# Patient Record
Sex: FEMALE | Race: WHITE | NOT HISPANIC OR LATINO
[De-identification: names, ages, dates, MRNs, and addresses within clinical notes are randomized per-mention and may not be internally consistent; named-entity substitution may affect disease eponyms.]

---

## 2019-01-24 ENCOUNTER — APPOINTMENT (OUTPATIENT)
Dept: OBGYN | Facility: CLINIC | Age: 19
End: 2019-01-24
Payer: COMMERCIAL

## 2019-01-24 VITALS
WEIGHT: 150 LBS | SYSTOLIC BLOOD PRESSURE: 100 MMHG | BODY MASS INDEX: 25.61 KG/M2 | HEIGHT: 64 IN | DIASTOLIC BLOOD PRESSURE: 75 MMHG

## 2019-01-24 DIAGNOSIS — Z83.3 FAMILY HISTORY OF DIABETES MELLITUS: ICD-10-CM

## 2019-01-24 DIAGNOSIS — Z78.9 OTHER SPECIFIED HEALTH STATUS: ICD-10-CM

## 2019-01-24 DIAGNOSIS — Z82.49 FAMILY HISTORY OF ISCHEMIC HEART DISEASE AND OTHER DISEASES OF THE CIRCULATORY SYSTEM: ICD-10-CM

## 2019-01-24 DIAGNOSIS — F19.90 OTHER PSYCHOACTIVE SUBSTANCE USE, UNSPECIFIED, UNCOMPLICATED: ICD-10-CM

## 2019-01-24 DIAGNOSIS — F17.200 NICOTINE DEPENDENCE, UNSPECIFIED, UNCOMPLICATED: ICD-10-CM

## 2019-01-24 PROCEDURE — 99385 PREV VISIT NEW AGE 18-39: CPT

## 2019-01-24 NOTE — PHYSICAL EXAM
[Awake] : awake [Alert] : alert [Acute Distress] : no acute distress [Mass] : no breast mass [Nipple Discharge] : no nipple discharge [Axillary LAD] : no axillary lymphadenopathy [Soft] : soft [Tender] : non tender [Oriented x3] : oriented to person, place, and time [Normal] : uterus [No Bleeding] : there was no active vaginal bleeding [Uterine Adnexae] : were not tender and not enlarged [RRR, No Murmurs] : RRR, no murmurs [CTAB] : CTAB

## 2019-01-28 LAB
C TRACH RRNA SPEC QL NAA+PROBE: NOT DETECTED
N GONORRHOEA RRNA SPEC QL NAA+PROBE: NOT DETECTED
SOURCE AMPLIFICATION: NORMAL

## 2019-03-05 ENCOUNTER — ASOB RESULT (OUTPATIENT)
Age: 19
End: 2019-03-05

## 2019-03-05 ENCOUNTER — APPOINTMENT (OUTPATIENT)
Dept: OBGYN | Facility: CLINIC | Age: 19
End: 2019-03-05
Payer: COMMERCIAL

## 2019-03-05 VITALS
BODY MASS INDEX: 26.29 KG/M2 | HEIGHT: 64 IN | WEIGHT: 154 LBS | SYSTOLIC BLOOD PRESSURE: 102 MMHG | DIASTOLIC BLOOD PRESSURE: 68 MMHG

## 2019-03-05 PROCEDURE — 76857 US EXAM PELVIC LIMITED: CPT

## 2019-03-05 PROCEDURE — 81025 URINE PREGNANCY TEST: CPT

## 2019-03-05 PROCEDURE — 99213 OFFICE O/P EST LOW 20 MIN: CPT

## 2019-03-05 PROCEDURE — 76830 TRANSVAGINAL US NON-OB: CPT

## 2019-03-06 LAB
HCG UR QL: NEGATIVE
QUALITY CONTROL: YES

## 2019-05-16 ENCOUNTER — APPOINTMENT (OUTPATIENT)
Dept: OBGYN | Facility: CLINIC | Age: 19
End: 2019-05-16
Payer: COMMERCIAL

## 2019-05-16 VITALS
SYSTOLIC BLOOD PRESSURE: 109 MMHG | HEART RATE: 70 BPM | WEIGHT: 150 LBS | DIASTOLIC BLOOD PRESSURE: 62 MMHG | HEIGHT: 64 IN | BODY MASS INDEX: 25.61 KG/M2

## 2019-05-16 PROCEDURE — 99213 OFFICE O/P EST LOW 20 MIN: CPT

## 2019-05-16 PROCEDURE — 81003 URINALYSIS AUTO W/O SCOPE: CPT | Mod: NC,QW

## 2019-05-16 NOTE — PHYSICAL EXAM
[Soft] : soft [None] : no CVA tenderness [Tender] : non tender [H/Smegaly] : no hepatosplenomegaly [Distended] : not distended

## 2019-05-20 LAB — BACTERIA UR CULT: NORMAL

## 2020-10-11 ENCOUNTER — TRANSCRIPTION ENCOUNTER (OUTPATIENT)
Age: 20
End: 2020-10-11

## 2020-11-17 ENCOUNTER — APPOINTMENT (OUTPATIENT)
Dept: OBGYN | Facility: CLINIC | Age: 20
End: 2020-11-17
Payer: COMMERCIAL

## 2020-11-17 VITALS
HEIGHT: 64 IN | WEIGHT: 150 LBS | BODY MASS INDEX: 25.61 KG/M2 | SYSTOLIC BLOOD PRESSURE: 110 MMHG | DIASTOLIC BLOOD PRESSURE: 70 MMHG

## 2020-11-17 PROCEDURE — 99214 OFFICE O/P EST MOD 30 MIN: CPT

## 2020-11-17 RX ORDER — PHENAZOPYRIDINE HYDROCHLORIDE 200 MG/1
200 TABLET ORAL
Qty: 15 | Refills: 0 | Status: ACTIVE | COMMUNITY
Start: 2020-11-17 | End: 1900-01-01

## 2020-11-22 LAB
BACTERIA GENITAL AEROBE CULT: ABNORMAL
BACTERIA UR CULT: ABNORMAL

## 2021-04-19 ENCOUNTER — TRANSCRIPTION ENCOUNTER (OUTPATIENT)
Age: 21
End: 2021-04-19

## 2022-11-24 ENCOUNTER — EMERGENCY (EMERGENCY)
Facility: HOSPITAL | Age: 22
LOS: 1 days | Discharge: HOME | End: 2022-11-24
Attending: EMERGENCY MEDICINE
Payer: COMMERCIAL

## 2022-11-24 VITALS
RESPIRATION RATE: 16 BRPM | DIASTOLIC BLOOD PRESSURE: 79 MMHG | TEMPERATURE: 97 F | SYSTOLIC BLOOD PRESSURE: 120 MMHG | HEART RATE: 77 BPM | OXYGEN SATURATION: 100 %

## 2022-11-24 LAB
APPEARANCE UR: CLEAR — SIGNIFICANT CHANGE UP
BACTERIA # UR AUTO: ABNORMAL
BASOPHILS # BLD AUTO: 0.06 K/UL — SIGNIFICANT CHANGE UP (ref 0–0.2)
BASOPHILS NFR BLD AUTO: 0.8 % — SIGNIFICANT CHANGE UP (ref 0–2)
BILIRUB UR-MCNC: NEGATIVE — SIGNIFICANT CHANGE UP
COLOR SPEC: YELLOW — SIGNIFICANT CHANGE UP
DIFF PNL FLD: ABNORMAL
EOSINOPHIL # BLD AUTO: 0.1 K/UL — SIGNIFICANT CHANGE UP (ref 0–0.5)
EOSINOPHIL NFR BLD AUTO: 1.3 % — SIGNIFICANT CHANGE UP (ref 0–6)
EPI CELLS # UR: SIGNIFICANT CHANGE UP
GLUCOSE UR QL: NEGATIVE MG/DL — SIGNIFICANT CHANGE UP
HCG SERPL-ACNC: <4 MIU/ML — SIGNIFICANT CHANGE UP
HCG UR QL: NEGATIVE — SIGNIFICANT CHANGE UP
HCT VFR BLD CALC: 36.9 % — SIGNIFICANT CHANGE UP (ref 34.5–45)
HGB BLD-MCNC: 12.6 G/DL — SIGNIFICANT CHANGE UP (ref 11.5–15.5)
IMM GRANULOCYTES NFR BLD AUTO: 0.4 % — SIGNIFICANT CHANGE UP (ref 0–0.9)
KETONES UR-MCNC: NEGATIVE — SIGNIFICANT CHANGE UP
LEUKOCYTE ESTERASE UR-ACNC: NEGATIVE — SIGNIFICANT CHANGE UP
LYMPHOCYTES # BLD AUTO: 1.7 K/UL — SIGNIFICANT CHANGE UP (ref 1–3.3)
LYMPHOCYTES # BLD AUTO: 22.7 % — SIGNIFICANT CHANGE UP (ref 13–44)
MCHC RBC-ENTMCNC: 31.8 PG — SIGNIFICANT CHANGE UP (ref 27–34)
MCHC RBC-ENTMCNC: 34.1 GM/DL — SIGNIFICANT CHANGE UP (ref 32–36)
MCV RBC AUTO: 93.2 FL — SIGNIFICANT CHANGE UP (ref 80–100)
MONOCYTES # BLD AUTO: 0.61 K/UL — SIGNIFICANT CHANGE UP (ref 0–0.9)
MONOCYTES NFR BLD AUTO: 8.1 % — SIGNIFICANT CHANGE UP (ref 2–14)
NEUTROPHILS # BLD AUTO: 4.99 K/UL — SIGNIFICANT CHANGE UP (ref 1.8–7.4)
NEUTROPHILS NFR BLD AUTO: 66.7 % — SIGNIFICANT CHANGE UP (ref 43–77)
NITRITE UR-MCNC: NEGATIVE — SIGNIFICANT CHANGE UP
PH UR: 8 — SIGNIFICANT CHANGE UP (ref 5–8)
PLATELET # BLD AUTO: 364 K/UL — SIGNIFICANT CHANGE UP (ref 150–400)
PROT UR-MCNC: NEGATIVE — SIGNIFICANT CHANGE UP
RBC # BLD: 3.96 M/UL — SIGNIFICANT CHANGE UP (ref 3.8–5.2)
RBC # FLD: 11.9 % — SIGNIFICANT CHANGE UP (ref 10.3–14.5)
RBC CASTS # UR COMP ASSIST: >50 /HPF (ref 0–4)
SP GR SPEC: 1.01 — SIGNIFICANT CHANGE UP (ref 1.01–1.02)
UROBILINOGEN FLD QL: NEGATIVE MG/DL — SIGNIFICANT CHANGE UP
WBC # BLD: 7.49 K/UL — SIGNIFICANT CHANGE UP (ref 3.8–10.5)
WBC # FLD AUTO: 7.49 K/UL — SIGNIFICANT CHANGE UP (ref 3.8–10.5)
WBC UR QL: SIGNIFICANT CHANGE UP /HPF (ref 0–5)

## 2022-11-24 PROCEDURE — 85025 COMPLETE CBC W/AUTO DIFF WBC: CPT

## 2022-11-24 PROCEDURE — 87077 CULTURE AEROBIC IDENTIFY: CPT

## 2022-11-24 PROCEDURE — 81025 URINE PREGNANCY TEST: CPT

## 2022-11-24 PROCEDURE — 99284 EMERGENCY DEPT VISIT MOD MDM: CPT

## 2022-11-24 PROCEDURE — 99283 EMERGENCY DEPT VISIT LOW MDM: CPT

## 2022-11-24 PROCEDURE — 84702 CHORIONIC GONADOTROPIN TEST: CPT

## 2022-11-24 PROCEDURE — 81001 URINALYSIS AUTO W/SCOPE: CPT

## 2022-11-24 PROCEDURE — 36415 COLL VENOUS BLD VENIPUNCTURE: CPT

## 2022-11-24 PROCEDURE — 87086 URINE CULTURE/COLONY COUNT: CPT

## 2022-11-24 NOTE — ED STATDOCS - PATIENT PORTAL LINK FT
You can access the FollowMyHealth Patient Portal offered by [facility] by registering at the following website: [email or web address]. By joining [email or web address]’s FollowMyHealth portal, you will also be able to view your health information using other applications (apps) compatible with our system.

## 2022-11-24 NOTE — ED STATDOCS - ATTENDING APP SHARED VISIT CONTRIBUTION OF CARE
I, Rosa Castaneda, performed the initial face to face bedside interview with this patient regarding history of present illness, review of symptoms and relevant past medical, social and family history.  I completed an independent physical examination.  I was the initial provider who evaluated this patient. I have signed out the follow up of any pending tests (i.e. labs, radiological studies) to the ACP.  I have communicated the patient’s plan of care and disposition with the ACP.

## 2022-11-24 NOTE — ED STATDOCS - PROGRESS NOTE DETAILS
POLO- h&H stable, neg pregnancy, pt to f/u with obgyn, Pt reassessed, pt feeling better at this time, vss, pt able to walk, talk and vocalized plan of action. Discussed in depth and explained to pt in depth the next steps that need to be taking including proper follow up with PCP or specialists. All incidental findings were discussed with pt as well. Pt verbalized their concerns and all questions were answered. Pt understands dispo and wants discharge. Given good instructions when to return to ED and importance of f/u.

## 2022-11-24 NOTE — ED ADULT TRIAGE NOTE - CHIEF COMPLAINT QUOTE
Pt. complaining of vaginally bleeding since Saturday. Pt. states she doesn't know if its her period or not. Pt. states she could be pregnant. Pt. states her last period was sometime in October.

## 2022-11-24 NOTE — ED STATDOCS - NS ED ATTENDING STATEMENT MOD
This was a shared visit with the ULISES. I reviewed and verified the documentation and independently performed the documented:

## 2022-11-24 NOTE — ED STATDOCS - OBJECTIVE STATEMENT
21 y/o female with no significant PMHx presents to the ED c/o worsening vaginal bleeding for 5 days. Current menstrual cycle began 5 days ago, which was light brown or pinkish in color. Previous cycle was in mid October. Pt has a Hx of irregular menstrual cycles and is concerned for possible pregnancy. Pt took a pregnancy test one day ago which came back negative. As per pt, period typically lasts 2-4 days, but this current period is longer and getting gradually heavier. Reports associated nausea yesterday but no vomiting. No Hx of fibroids, but has one episode of seizures in 2021. Denies taking daily medication. Denies f/c/v/cp/sob/palpitations/cough/rash/headache/dizziness/abd.pain/d/c/dysuria/hematuria

## 2022-11-24 NOTE — ED STATDOCS - CLINICAL SUMMARY MEDICAL DECISION MAKING FREE TEXT BOX
Possibly dysfunctional uterine bleeding as notes current period is different from prior periods. here to check for pregnancy. Will check HCG, UA and re-assess. If pregnant will need an US.

## 2022-11-28 LAB
CULTURE RESULTS: SIGNIFICANT CHANGE UP
SPECIMEN SOURCE: SIGNIFICANT CHANGE UP

## 2023-01-28 ENCOUNTER — EMERGENCY (EMERGENCY)
Facility: HOSPITAL | Age: 23
LOS: 1 days | Discharge: HOME | End: 2023-01-28
Attending: EMERGENCY MEDICINE
Payer: COMMERCIAL

## 2023-01-28 VITALS
WEIGHT: 125 LBS | HEART RATE: 83 BPM | RESPIRATION RATE: 20 BRPM | HEIGHT: 64 IN | SYSTOLIC BLOOD PRESSURE: 119 MMHG | TEMPERATURE: 98 F | DIASTOLIC BLOOD PRESSURE: 80 MMHG | OXYGEN SATURATION: 98 %

## 2023-01-28 PROCEDURE — 73564 X-RAY EXAM KNEE 4 OR MORE: CPT

## 2023-01-28 PROCEDURE — 96374 THER/PROPH/DIAG INJ IV PUSH: CPT

## 2023-01-28 PROCEDURE — 99284 EMERGENCY DEPT VISIT MOD MDM: CPT

## 2023-01-28 PROCEDURE — 99284 EMERGENCY DEPT VISIT MOD MDM: CPT | Mod: 25

## 2023-01-28 PROCEDURE — 73564 X-RAY EXAM KNEE 4 OR MORE: CPT | Mod: 26,RT

## 2023-01-28 RX ORDER — IBUPROFEN 200 MG
1 TABLET ORAL
Qty: 20 | Refills: 0
Start: 2023-01-28 | End: 2023-02-01

## 2023-01-28 RX ORDER — CEFAZOLIN SODIUM 1 G
2000 VIAL (EA) INJECTION ONCE
Refills: 0 | Status: COMPLETED | OUTPATIENT
Start: 2023-01-28 | End: 2023-01-28

## 2023-01-28 RX ORDER — CEFAZOLIN SODIUM 1 G
2000 VIAL (EA) INJECTION ONCE
Refills: 0 | Status: DISCONTINUED | OUTPATIENT
Start: 2023-01-28 | End: 2023-01-28

## 2023-01-28 RX ORDER — CEPHALEXIN 500 MG
1 CAPSULE ORAL
Qty: 40 | Refills: 0
Start: 2023-01-28 | End: 2023-02-06

## 2023-01-28 RX ADMIN — Medication 2000 MILLIGRAM(S): at 17:53

## 2023-01-28 NOTE — ED PROVIDER NOTE - CARE PROVIDER_API CALL
Wesley Chisholm)  Orthopaedic Surgery  [address] [place] [address]  Phone: [phone]  Fax: [phone]  Follow Up Time:

## 2023-01-28 NOTE — ED PROVIDER NOTE - PATIENT PORTAL LINK FT
You can access the FollowMyHealth Patient Portal offered by [facility] by registering at the following website: [email or web address]. By joining [employer]’s FollowMyHealth portal, you will also be able to view your health information using other applications (apps) compatible with our system.

## 2023-01-28 NOTE — ED PROVIDER NOTE - CLINICAL SUMMARY MEDICAL DECISION MAKING FREE TEXT BOX
22F presenting with right knee cellulitis x 3 days. +FROM of the right knee with no actual knee pain reported. Xrays reviewed - negative for acute pathology. Pts given Ancef x 1 dose IV in the ED. Keflex sent to the pharmacy. Pt given ortho f/u as needed. Instructed to f/u with ortho or return to the ED if she develops swelling, worsening redness or fluid collection.

## 2023-01-28 NOTE — ED PROVIDER NOTE - SKIN, MLM
+ localized region of erythema over the right anterior knee approx 3cm x 3cm in size. No palpable fluid collection. No open wounds or active bleeding. No streaking erythema.

## 2023-03-16 ENCOUNTER — APPOINTMENT (OUTPATIENT)
Dept: OBGYN | Facility: CLINIC | Age: 23
End: 2023-03-16
Payer: COMMERCIAL

## 2023-03-16 VITALS
DIASTOLIC BLOOD PRESSURE: 67 MMHG | HEIGHT: 64 IN | SYSTOLIC BLOOD PRESSURE: 120 MMHG | WEIGHT: 128 LBS | BODY MASS INDEX: 21.85 KG/M2

## 2023-03-16 DIAGNOSIS — Z00.00 ENCOUNTER FOR GENERAL ADULT MEDICAL EXAMINATION W/OUT ABNORMAL FINDINGS: ICD-10-CM

## 2023-03-16 LAB
HCG UR QL: POSITIVE
QUALITY CONTROL: YES

## 2023-03-16 PROCEDURE — 76830 TRANSVAGINAL US NON-OB: CPT

## 2023-03-16 PROCEDURE — 99395 PREV VISIT EST AGE 18-39: CPT

## 2023-03-16 PROCEDURE — 99213 OFFICE O/P EST LOW 20 MIN: CPT | Mod: 25

## 2023-03-16 PROCEDURE — 81025 URINE PREGNANCY TEST: CPT

## 2023-03-16 RX ORDER — ASCORBIC ACID, CHOLECALCIFEROL, .ALPHA.-TOCOPHEROL ACETATE, DL-, PYRIDOXINE, FOLIC ACID, CALCIUM, FERROUS FUMARATE, DOCONEXENT 28; 160; 27; 400; 30; 25; 1.25; 3 MG/1; MG/1; MG/1; [IU]/1; [IU]/1; MG/1; MG/1; MG/1
27-1.25-3 CAPSULE, GELATIN COATED ORAL
Qty: 90 | Refills: 3 | Status: ACTIVE | COMMUNITY
Start: 2023-03-16 | End: 1900-01-01

## 2023-03-18 LAB
C TRACH RRNA SPEC QL NAA+PROBE: NOT DETECTED
N GONORRHOEA RRNA SPEC QL NAA+PROBE: NOT DETECTED
SOURCE TP AMPLIFICATION: NORMAL

## 2023-03-19 PROBLEM — Z00.00 ENCOUNTER FOR PREVENTIVE HEALTH EXAMINATION: Status: ACTIVE | Noted: 2019-01-02

## 2023-03-19 NOTE — HISTORY OF PRESENT ILLNESS
[FreeTextEntry1] : 23 yo pt here for annual exam and eval of sc amen. \par was trying to conceive, not taking pnv. \par meds- none\par nkda\par co nausea/vomiting in am past few days\par

## 2023-03-19 NOTE — PLAN
[FreeTextEntry1] : annual exam. sec amen , tvus shows viable iup. \par pnv prescribed. \par n/v in pregnancy, dw pt trial of diclegis, prescribed. \par dw pt ativity and dietary restrictions, toxin avoidance.

## 2023-03-19 NOTE — PROCEDURE
[Transvaginal OB Sonogram] : Transvaginal OB Sonogram [Intrauterine Pregnancy] : intrauterine pregnancy [Yolk Sac] : yolk sac present [Fetal Heart] : fetal heart present [CRL: ___ (mm)] : CRL - [unfilled]Umm [Date: ___] : Date: [unfilled] [Current GA by Sonogram: ___ (wks)] : Current GA by Sonogram: [unfilled]Uwks [___ day(s)] : [unfilled] days [Transvaginal OB Sonogram WNL] : Transvaginal OB Sonogram WNL [FreeTextEntry1] : early iup

## 2023-03-23 LAB — CYTOLOGY CVX/VAG DOC THIN PREP: NORMAL

## 2023-04-03 ENCOUNTER — NON-APPOINTMENT (OUTPATIENT)
Age: 23
End: 2023-04-03

## 2023-04-04 ENCOUNTER — NON-APPOINTMENT (OUTPATIENT)
Age: 23
End: 2023-04-04

## 2023-04-04 ENCOUNTER — APPOINTMENT (OUTPATIENT)
Dept: OBGYN | Facility: CLINIC | Age: 23
End: 2023-04-04
Payer: COMMERCIAL

## 2023-04-04 VITALS
SYSTOLIC BLOOD PRESSURE: 133 MMHG | BODY MASS INDEX: 22.02 KG/M2 | HEIGHT: 64 IN | WEIGHT: 129 LBS | DIASTOLIC BLOOD PRESSURE: 77 MMHG

## 2023-04-04 PROCEDURE — 0501F PRENATAL FLOW SHEET: CPT

## 2023-04-08 ENCOUNTER — LABORATORY RESULT (OUTPATIENT)
Age: 23
End: 2023-04-08

## 2023-04-09 ENCOUNTER — EMERGENCY (EMERGENCY)
Facility: HOSPITAL | Age: 23
LOS: 1 days | Discharge: HOME | End: 2023-04-09
Attending: EMERGENCY MEDICINE
Payer: COMMERCIAL

## 2023-04-09 VITALS
HEIGHT: 65 IN | OXYGEN SATURATION: 98 % | TEMPERATURE: 98 F | DIASTOLIC BLOOD PRESSURE: 83 MMHG | RESPIRATION RATE: 18 BRPM | SYSTOLIC BLOOD PRESSURE: 126 MMHG | WEIGHT: 128.09 LBS | HEART RATE: 82 BPM

## 2023-04-09 VITALS
HEART RATE: 80 BPM | SYSTOLIC BLOOD PRESSURE: 122 MMHG | RESPIRATION RATE: 18 BRPM | TEMPERATURE: 98 F | OXYGEN SATURATION: 98 % | DIASTOLIC BLOOD PRESSURE: 78 MMHG

## 2023-04-09 LAB
ALBUMIN SERPL ELPH-MCNC: 4.3 G/DL — SIGNIFICANT CHANGE UP (ref 3.3–5.2)
ALP SERPL-CCNC: 46 U/L — SIGNIFICANT CHANGE UP (ref 40–120)
ALT FLD-CCNC: 9 U/L — SIGNIFICANT CHANGE UP
ANION GAP SERPL CALC-SCNC: 12 MMOL/L — SIGNIFICANT CHANGE UP (ref 5–17)
AST SERPL-CCNC: 11 U/L — SIGNIFICANT CHANGE UP
BASOPHILS # BLD AUTO: 0.05 K/UL — SIGNIFICANT CHANGE UP (ref 0–0.2)
BASOPHILS NFR BLD AUTO: 0.7 % — SIGNIFICANT CHANGE UP (ref 0–2)
BILIRUB SERPL-MCNC: 0.8 MG/DL — SIGNIFICANT CHANGE UP (ref 0.4–2)
BUN SERPL-MCNC: 9.1 MG/DL — SIGNIFICANT CHANGE UP (ref 8–20)
CALCIUM SERPL-MCNC: 9.1 MG/DL — SIGNIFICANT CHANGE UP (ref 8.4–10.5)
CHLORIDE SERPL-SCNC: 104 MMOL/L — SIGNIFICANT CHANGE UP (ref 96–108)
CO2 SERPL-SCNC: 24 MMOL/L — SIGNIFICANT CHANGE UP (ref 22–29)
CREAT SERPL-MCNC: 0.43 MG/DL — LOW (ref 0.5–1.3)
CRP SERPL-MCNC: <4 MG/L — SIGNIFICANT CHANGE UP
EGFR: 139 ML/MIN/1.73M2 — SIGNIFICANT CHANGE UP
EOSINOPHIL # BLD AUTO: 0.2 K/UL — SIGNIFICANT CHANGE UP (ref 0–0.5)
EOSINOPHIL NFR BLD AUTO: 2.6 % — SIGNIFICANT CHANGE UP (ref 0–6)
ERYTHROCYTE [SEDIMENTATION RATE] IN BLOOD: 20 MM/HR — SIGNIFICANT CHANGE UP (ref 0–20)
GLUCOSE SERPL-MCNC: 86 MG/DL — SIGNIFICANT CHANGE UP (ref 70–99)
HCG SERPL-ACNC: HIGH MIU/ML
HCT VFR BLD CALC: 33.8 % — LOW (ref 34.5–45)
HGB BLD-MCNC: 11.5 G/DL — SIGNIFICANT CHANGE UP (ref 11.5–15.5)
IMM GRANULOCYTES NFR BLD AUTO: 0.4 % — SIGNIFICANT CHANGE UP (ref 0–0.9)
LYMPHOCYTES # BLD AUTO: 1.86 K/UL — SIGNIFICANT CHANGE UP (ref 1–3.3)
LYMPHOCYTES # BLD AUTO: 24.2 % — SIGNIFICANT CHANGE UP (ref 13–44)
MCHC RBC-ENTMCNC: 30.1 PG — SIGNIFICANT CHANGE UP (ref 27–34)
MCHC RBC-ENTMCNC: 34 GM/DL — SIGNIFICANT CHANGE UP (ref 32–36)
MCV RBC AUTO: 88.5 FL — SIGNIFICANT CHANGE UP (ref 80–100)
MONOCYTES # BLD AUTO: 0.57 K/UL — SIGNIFICANT CHANGE UP (ref 0–0.9)
MONOCYTES NFR BLD AUTO: 7.4 % — SIGNIFICANT CHANGE UP (ref 2–14)
NEUTROPHILS # BLD AUTO: 4.97 K/UL — SIGNIFICANT CHANGE UP (ref 1.8–7.4)
NEUTROPHILS NFR BLD AUTO: 64.7 % — SIGNIFICANT CHANGE UP (ref 43–77)
PLATELET # BLD AUTO: 322 K/UL — SIGNIFICANT CHANGE UP (ref 150–400)
POTASSIUM SERPL-MCNC: 3.8 MMOL/L — SIGNIFICANT CHANGE UP (ref 3.5–5.3)
POTASSIUM SERPL-SCNC: 3.8 MMOL/L — SIGNIFICANT CHANGE UP (ref 3.5–5.3)
PROT SERPL-MCNC: 7 G/DL — SIGNIFICANT CHANGE UP (ref 6.6–8.7)
RAPID RVP RESULT: SIGNIFICANT CHANGE UP
RBC # BLD: 3.82 M/UL — SIGNIFICANT CHANGE UP (ref 3.8–5.2)
RBC # FLD: 12.1 % — SIGNIFICANT CHANGE UP (ref 10.3–14.5)
SARS-COV-2 RNA SPEC QL NAA+PROBE: SIGNIFICANT CHANGE UP
SODIUM SERPL-SCNC: 140 MMOL/L — SIGNIFICANT CHANGE UP (ref 135–145)
WBC # BLD: 7.68 K/UL — SIGNIFICANT CHANGE UP (ref 3.8–10.5)
WBC # FLD AUTO: 7.68 K/UL — SIGNIFICANT CHANGE UP (ref 3.8–10.5)

## 2023-04-09 PROCEDURE — 85652 RBC SED RATE AUTOMATED: CPT

## 2023-04-09 PROCEDURE — 80053 COMPREHEN METABOLIC PANEL: CPT

## 2023-04-09 PROCEDURE — 86618 LYME DISEASE ANTIBODY: CPT

## 2023-04-09 PROCEDURE — 86666 EHRLICHIA ANTIBODY: CPT

## 2023-04-09 PROCEDURE — 86140 C-REACTIVE PROTEIN: CPT

## 2023-04-09 PROCEDURE — 99283 EMERGENCY DEPT VISIT LOW MDM: CPT

## 2023-04-09 PROCEDURE — 0225U NFCT DS DNA&RNA 21 SARSCOV2: CPT

## 2023-04-09 PROCEDURE — 85025 COMPLETE CBC W/AUTO DIFF WBC: CPT

## 2023-04-09 PROCEDURE — 86753 PROTOZOA ANTIBODY NOS: CPT

## 2023-04-09 PROCEDURE — 84702 CHORIONIC GONADOTROPIN TEST: CPT

## 2023-04-09 PROCEDURE — 99284 EMERGENCY DEPT VISIT MOD MDM: CPT

## 2023-04-09 PROCEDURE — 36415 COLL VENOUS BLD VENIPUNCTURE: CPT

## 2023-04-09 NOTE — ED ADULT NURSE NOTE - CAS ELECT INFOMATION PROVIDED
DC instructions given by MD and verbalized understanding. Pt remains alert and O x4. NAD noted. Resp E/U./DC instructions

## 2023-04-09 NOTE — ED PROVIDER NOTE - PHYSICAL EXAMINATION
Const: Awake, alert and oriented. In no acute distress. Well appearing.  HEENT: NC/AT. Moist mucous membranes.  Eyes: No scleral icterus. EOMI.  Neck:. Soft and supple. Full ROM without pain.  Cardiac: +S1/S2. No murmurs. Peripheral pulses 2+ and symmetric. No LE edema.  Resp: Speaking in full sentences. No evidence of respiratory distress. No wheezes, rales or rhonchi.  Abd: Soft, non-tender, non-distended. Normal bowel sounds in all 4 quadrants. No guarding or rebound.  Back: Spine midline and non-tender. No CVAT.  Skin: No rashes, abrasions or lacerations. no signs of infection noted, no redness  MSK: FROM in all extremities neurovasculary intact   Lymph: No cervical lymphadenopathy.  Neuro: Awake, alert & oriented x 3. Moves all extremities symmetrically.

## 2023-04-09 NOTE — ED PROVIDER NOTE - NSFOLLOWUPCLINICS_GEN_ALL_ED_FT
[facility]  Rheumatology  [address] [address]  Phone: [phone]  Fax:

## 2023-04-09 NOTE — ED PROVIDER NOTE - ATTENDING APP SHARED VISIT CONTRIBUTION OF CARE
23yoF; with PMH with no signif PMH; now p/w arthralgias. patient states she was here for similar symptoms in January and was diagnosed with cellulitis of knees at that time--was discharged on keflex but did not complete the full course of abx.  patient states this evening, she began having bilateral knee, ankle, and elbow pain.  denies f/c/s. denies n/v. denies cp/sob/palp. denies abd pain. denies dysuria, frequency, urgency. denies vaginal bleeding.  denies trauma. denies sick contacts.  states she is 10 weeks pregnancy, but no abd and vaginal concerns.  General:     NAD  Head:     NC/AT, EOMI, oral mucosa moist  Neck:     trachea midline  Lungs:     CTA b/l, no w/r/r  CVS:     S1S2, RRR, no m/g/r  Abd:     +BS, s/nt/nd, no organomegaly  Ext:    2+ radial and pedal pulses, no c/c/e. from @ bilateral hip, knee, ankle.  trace bilateral knee effusion. no erythema over knee. no warmth over joints. from @ shoulders, elbows, wrist.   Neuro: AAOx3, no sensory/motor deficits  A/P:  23yoF p/w arthralgia  -labs, rvp, ua, re-eval

## 2023-04-09 NOTE — ED PROVIDER NOTE - PATIENT PORTAL LINK FT
You can access the FollowMyHealth Patient Portal offered by [facility] by registering at the following website: [email or web address]. By joining [email or web address]’s FollowMyHealth portal, you will also be able to view your health information using other applications (apps) compatible with our system.

## 2023-04-09 NOTE — ED PROVIDER NOTE - OBJECTIVE STATEMENT
pt is a 24 y/o female presenting to the ed for evaluation of knee pain. pt states back in january she came to the ed knee pain/redness and was dx with cellulitis. pt states she never finished the course of antibiotics. pt states now is experiencing pain to both knees since this morning and feels they are red. pt also stating has pain to bilateral ankles and elbows. pt denies hx of lyme disease no recent ticks  pt denies cp sob fever abd pain back pain numbness or loss of sensation abrasions or lacerations

## 2023-04-09 NOTE — ED PROVIDER NOTE - CLINICAL SUMMARY MEDICAL DECISION MAKING FREE TEXT BOX
pt is a 22 y/o female presenting to the ed for evaluation of knee pain. pt states back in january she came to the ed knee pain/redness and was dx with cellulitis. pt states she never finished the course of antibiotics. pt states now is experiencing pain to both knees since this morning and feels they are red. pt also stating has pain to bilateral ankles and elbows. pt denies hx of lyme disease no recent ticks, will check lab work - cbc/cmp/esr/crp/lupus/lyme

## 2023-04-09 NOTE — ED ADULT TRIAGE NOTE - CHIEF COMPLAINT QUOTE
pt presents to ED complaining of R knee pain since dx with cellulitis of R knee in January and pain hasn't completely gone away. pt also complaining of generalized joint pain, took no meds PTA.

## 2023-04-10 LAB
B BURGDOR C6 AB SER-ACNC: NEGATIVE — SIGNIFICANT CHANGE UP
B BURGDOR IGG+IGM SER-ACNC: 0.14 INDEX — SIGNIFICANT CHANGE UP (ref 0.01–0.89)
DRVVT RATIO: 1.02 RATIO — SIGNIFICANT CHANGE UP (ref 0–1.21)
DRVVT SCREEN TO CONFIRM RATIO: SIGNIFICANT CHANGE UP
NORMALIZED SCT PPP-RTO: 1.11 RATIO — SIGNIFICANT CHANGE UP (ref 0–1.16)
NORMALIZED SCT PPP-RTO: SIGNIFICANT CHANGE UP

## 2023-04-12 LAB
A PHAGOCYTOPH IGG TITR SER IF: SIGNIFICANT CHANGE UP TITER
B BURGDOR AB SER QL IA: NEGATIVE — SIGNIFICANT CHANGE UP
B MICROTI IGG TITR SER: SIGNIFICANT CHANGE UP TITER
E CHAFFEENSIS IGG TITR SER IF: SIGNIFICANT CHANGE UP TITER

## 2023-04-17 ENCOUNTER — TRANSCRIPTION ENCOUNTER (OUTPATIENT)
Age: 23
End: 2023-04-17

## 2023-04-17 LAB
CHROMOSOME13 INTERPRETATION: NORMAL
CHROMOSOME13 TEST RESULT: NORMAL
CHROMOSOME18 INTERPRETATION: NORMAL
CHROMOSOME18 TEST RESULT: NORMAL
CHROMOSOME21 INTERPRETATION: NORMAL
CHROMOSOME21 TEST RESULT: NORMAL
FETAL FRACTION: NORMAL
PERFORMANCE AND LIMITATIONS: NORMAL
SEX CHROMOSOME INTERPRETATION: NORMAL
SEX CHROMOSOME TEST RESULT: NORMAL
VERIFI PRENATAL TEST: NOT DETECTED

## 2023-05-02 ENCOUNTER — NON-APPOINTMENT (OUTPATIENT)
Age: 23
End: 2023-05-02

## 2023-05-02 ENCOUNTER — APPOINTMENT (OUTPATIENT)
Dept: OBGYN | Facility: CLINIC | Age: 23
End: 2023-05-02
Payer: COMMERCIAL

## 2023-05-02 VITALS
HEIGHT: 64 IN | SYSTOLIC BLOOD PRESSURE: 120 MMHG | DIASTOLIC BLOOD PRESSURE: 80 MMHG | WEIGHT: 136 LBS | BODY MASS INDEX: 23.22 KG/M2

## 2023-05-02 PROCEDURE — 0502F SUBSEQUENT PRENATAL CARE: CPT

## 2023-05-03 ENCOUNTER — NON-APPOINTMENT (OUTPATIENT)
Age: 23
End: 2023-05-03

## 2023-05-18 ENCOUNTER — APPOINTMENT (OUTPATIENT)
Dept: OBGYN | Facility: CLINIC | Age: 23
End: 2023-05-18
Payer: COMMERCIAL

## 2023-05-18 VITALS
DIASTOLIC BLOOD PRESSURE: 80 MMHG | SYSTOLIC BLOOD PRESSURE: 123 MMHG | HEIGHT: 65 IN | WEIGHT: 143 LBS | BODY MASS INDEX: 23.82 KG/M2

## 2023-05-18 PROCEDURE — 0502F SUBSEQUENT PRENATAL CARE: CPT

## 2023-05-18 RX ORDER — DOXYLAMINE SUCCINATE AND PYRIDOXINE HYDROCHLORIDE 10; 10 MG/1; MG/1
10-10 TABLET, DELAYED RELEASE ORAL
Qty: 60 | Refills: 1 | Status: ACTIVE | COMMUNITY
Start: 2023-05-18 | End: 1900-01-01

## 2023-06-19 ENCOUNTER — ASOB RESULT (OUTPATIENT)
Age: 23
End: 2023-06-19

## 2023-06-19 ENCOUNTER — APPOINTMENT (OUTPATIENT)
Dept: OBGYN | Facility: CLINIC | Age: 23
End: 2023-06-19
Payer: COMMERCIAL

## 2023-06-19 ENCOUNTER — APPOINTMENT (OUTPATIENT)
Dept: ANTEPARTUM | Facility: CLINIC | Age: 23
End: 2023-06-19
Payer: COMMERCIAL

## 2023-06-19 VITALS
SYSTOLIC BLOOD PRESSURE: 123 MMHG | DIASTOLIC BLOOD PRESSURE: 70 MMHG | BODY MASS INDEX: 24.32 KG/M2 | WEIGHT: 146 LBS | HEIGHT: 65 IN

## 2023-06-19 DIAGNOSIS — Z33.1 PREGNANT STATE, INCIDENTAL: ICD-10-CM

## 2023-06-19 DIAGNOSIS — R10.2 PELVIC AND PERINEAL PAIN: ICD-10-CM

## 2023-06-19 DIAGNOSIS — N91.1 SECONDARY AMENORRHEA: ICD-10-CM

## 2023-06-19 DIAGNOSIS — Z87.898 PERSONAL HISTORY OF OTHER SPECIFIED CONDITIONS: ICD-10-CM

## 2023-06-19 DIAGNOSIS — R39.9 UNSPECIFIED SYMPTOMS AND SIGNS INVOLVING THE GENITOURINARY SYSTEM: ICD-10-CM

## 2023-06-19 PROCEDURE — 76817 TRANSVAGINAL US OBSTETRIC: CPT

## 2023-06-19 PROCEDURE — 76811 OB US DETAILED SNGL FETUS: CPT

## 2023-06-19 PROCEDURE — 0502F SUBSEQUENT PRENATAL CARE: CPT

## 2023-06-21 ENCOUNTER — APPOINTMENT (OUTPATIENT)
Dept: MATERNAL FETAL MEDICINE | Facility: CLINIC | Age: 23
End: 2023-06-21

## 2023-06-21 ENCOUNTER — APPOINTMENT (OUTPATIENT)
Dept: ANTEPARTUM | Facility: CLINIC | Age: 23
End: 2023-06-21

## 2023-06-21 DIAGNOSIS — Z87.898 PERSONAL HISTORY OF OTHER SPECIFIED CONDITIONS: ICD-10-CM

## 2023-06-21 RX ORDER — NITROFURANTOIN (MONOHYDRATE/MACROCRYSTALS) 25; 75 MG/1; MG/1
100 CAPSULE ORAL TWICE DAILY
Qty: 14 | Refills: 0 | Status: DISCONTINUED | COMMUNITY
Start: 2020-11-17 | End: 2023-06-21

## 2023-06-21 RX ORDER — DOXYLAMINE SUCCINATE AND PYRIDOXINE HYDROCHLORIDE 10; 10 MG/1; MG/1
10-10 TABLET, DELAYED RELEASE ORAL
Qty: 28 | Refills: 4 | Status: DISCONTINUED | COMMUNITY
Start: 2023-03-19 | End: 2023-06-21

## 2023-06-21 RX ORDER — NITROFURANTOIN (MONOHYDRATE/MACROCRYSTALS) 25; 75 MG/1; MG/1
100 CAPSULE ORAL
Qty: 14 | Refills: 0 | Status: DISCONTINUED | COMMUNITY
Start: 2019-05-16 | End: 2023-06-21

## 2023-06-21 RX ORDER — NORGESTIMATE AND ETHINYL ESTRADIOL 0.25-0.035
0.25-35 KIT ORAL DAILY
Qty: 90 | Refills: 1 | Status: DISCONTINUED | COMMUNITY
Start: 2019-01-24 | End: 2023-06-21

## 2023-06-27 LAB
AFP MOM: 0.96
AFP VALUE: 70.9 NG/ML
ALPHA FETOPROTEIN SERUM COMMENT: NORMAL
ALPHA FETOPROTEIN SERUM INTERPRETATION: NORMAL
ALPHA FETOPROTEIN SERUM RESULTS: NORMAL
ALPHA FETOPROTEIN SERUM TEST RESULTS: NORMAL
GESTATIONAL AGE BASED ON: NORMAL
GESTATIONAL AGE ON COLLECTION DATE: 21 WEEKS
INSULIN DEP DIABETES: NO
MATERNAL AGE AT EDD AFP: 24.5 YR
MULTIPLE GESTATION: NO
OSBR RISK 1 IN: NORMAL
RACE: NORMAL
WEIGHT AFP: 128 LBS

## 2023-07-17 ENCOUNTER — NON-APPOINTMENT (OUTPATIENT)
Age: 23
End: 2023-07-17

## 2023-07-17 ENCOUNTER — APPOINTMENT (OUTPATIENT)
Dept: OBGYN | Facility: CLINIC | Age: 23
End: 2023-07-17
Payer: COMMERCIAL

## 2023-07-17 VITALS
BODY MASS INDEX: 24.99 KG/M2 | SYSTOLIC BLOOD PRESSURE: 110 MMHG | WEIGHT: 150 LBS | HEIGHT: 65 IN | DIASTOLIC BLOOD PRESSURE: 70 MMHG

## 2023-07-17 PROCEDURE — 0502F SUBSEQUENT PRENATAL CARE: CPT

## 2023-07-22 ENCOUNTER — OUTPATIENT (OUTPATIENT)
Dept: INPATIENT UNIT | Facility: HOSPITAL | Age: 23
LOS: 1 days | End: 2023-07-22
Payer: COMMERCIAL

## 2023-07-22 VITALS — SYSTOLIC BLOOD PRESSURE: 125 MMHG | HEART RATE: 74 BPM | DIASTOLIC BLOOD PRESSURE: 65 MMHG

## 2023-07-22 VITALS — DIASTOLIC BLOOD PRESSURE: 59 MMHG | SYSTOLIC BLOOD PRESSURE: 119 MMHG | HEART RATE: 77 BPM

## 2023-07-22 DIAGNOSIS — O26.899 OTHER SPECIFIED PREGNANCY RELATED CONDITIONS, UNSPECIFIED TRIMESTER: ICD-10-CM

## 2023-07-22 PROCEDURE — 59025 FETAL NON-STRESS TEST: CPT

## 2023-07-22 PROCEDURE — G0463: CPT

## 2023-07-22 NOTE — OB PROVIDER TRIAGE NOTE - HISTORY OF PRESENT ILLNESS
ASTON AVILA is a 23y GP at 24 weeks 6 days GA who presents to L&D for evaluation s/p fall from standing height. Patient was walking and slipped on wood floor. Her knee broke her fall, but her abdomen hit the ground.    Pt denies vaginal bleeding, contractions and leakage of fluid. She endorses good fetal movement.   Pt denies headaches, visual disturbances, RUQ pain, epigastric pain and new-onset edema.   She denies any urinary complaints.   She denies fevers, chills, nausea, vomiting.   She denies shortness of breath, chest pain, and palpitations.  Pregnancy course is  uncomplicated.     POB: Possible miscarriage, unknown  PGYN: -fibroids/-cysts, denied STD hx, denies abnormal PAPs  PMH: Denies  PSH: Denies  SH: Denies tobacco use, EtOH use and illicit drug use during the pregnancy; Feels safe at home  Meds: PNV  All: None       ASTON AVILA is a 23y GP at 24 weeks 6 days GA who presents to L&D for evaluation s/p fall from standing height at 1400. Patient was walking and slipped on wood floor. Her knee broke her fall, but her abdomen hit the ground.    Pt denies vaginal bleeding, contractions and leakage of fluid. She endorses good fetal movement.   Pt denies headaches, visual disturbances, RUQ pain, epigastric pain and new-onset edema.   She denies any urinary complaints.   She denies fevers, chills, nausea, vomiting.   She denies shortness of breath, chest pain, and palpitations.  Pregnancy course is  uncomplicated.     POB: Possible miscarriage, unknown  PGYN: -fibroids/-cysts, denied STD hx, denies abnormal PAPs  PMH: Denies  PSH: Denies  SH: Denies tobacco use, EtOH use and illicit drug use during the pregnancy; Feels safe at home  Meds: PNV  All: None

## 2023-07-22 NOTE — OB PROVIDER TRIAGE NOTE - NSHPPHYSICALEXAM_GEN_ALL_CORE
T(C): 36.7 (07-22-23 @ 16:51), Max: 36.7 (07-22-23 @ 16:51)  HR: 86 (07-22-23 @ 17:05) (74 - 86)  BP: 125/65 (07-22-23 @ 16:51) (125/65 - 125/65)  RR: 15 (07-22-23 @ 16:51) (15 - 15)  SpO2: 99% (07-22-23 @ 17:05) (99% - 100%)  Gen: NAD, well-appearing  Abd: soft, gravid, no evidence of hematoma  Ext: non-edematous, non-tender     FHT: Baseline 160. moderate variability, -accels, - decels  Stout: not lin

## 2023-07-22 NOTE — OB PROVIDER TRIAGE NOTE - NSOBPROVIDERNOTE_OBGYN_ALL_OB_FT
A/P: ASTON AVILA is a 23y GP at 24 weeks 6 days GA who presents to L&D for evaluation s/p fall from standing height.     Patient denies any abdominal pain, bruising, vaginal bleeding  Bedside sono:  Fetus: 160 baseline, moderate variability, - accels, - decels  [place]: no contractions  Dispo: Home with precautions    Discussed with Dr. Rubalcava A/P: ASTON AVILA is a 23y GP at 24 weeks 6 days GA who presents to L&D for evaluation s/p fall from standing height.     Patient denies any abdominal pain, bruising, vaginal bleeding    Fetus: 160 baseline, moderate variability, - accels, - decels  [place]: no contractions  Dispo: Home with precautions    Discussed with Dr. Rubalcava

## 2023-07-22 NOTE — OB PROVIDER TRIAGE NOTE - ATTENDING COMMENTS
Addendum:    Subjective Hx, Physical Exam, Laboratory & Imaging results reviewed.  I agree with the Resident Physician's assessment and plan of care, as discussed above.  FHR reassuring, gestational age too early for NST. Call, follow up, and return to Hospital parameters reviewed at length.  She was given the opportunity to ask questions and all were addressed. Discharge home.    Adithya Rubalcava MD (OB Hospitalist On-Call)

## 2023-07-22 NOTE — OB RN TRIAGE NOTE - FALL HARM RISK - RISK INTERVENTIONS

## 2023-07-22 NOTE — OB RN TRIAGE NOTE - NSDCRESPRATE_OBGYN_A_OB_NU
Post-Care Instructions: I reviewed with the patient in detail post-care instructions. Patient is to keep the biopsy site dry overnight, and then apply bacitracin twice daily until healed. Patient may apply hydrogen peroxide soaks to remove any crusting. 17

## 2023-07-24 DIAGNOSIS — Z3A.24 24 WEEKS GESTATION OF PREGNANCY: ICD-10-CM

## 2023-07-24 DIAGNOSIS — Z04.3 ENCOUNTER FOR EXAMINATION AND OBSERVATION FOLLOWING OTHER ACCIDENT: ICD-10-CM

## 2023-08-14 ENCOUNTER — ASOB RESULT (OUTPATIENT)
Age: 23
End: 2023-08-14

## 2023-08-14 ENCOUNTER — APPOINTMENT (OUTPATIENT)
Dept: ANTEPARTUM | Facility: CLINIC | Age: 23
End: 2023-08-14
Payer: COMMERCIAL

## 2023-08-14 PROCEDURE — 76816 OB US FOLLOW-UP PER FETUS: CPT

## 2023-08-14 PROCEDURE — 76820 UMBILICAL ARTERY ECHO: CPT | Mod: 59

## 2023-08-14 PROCEDURE — 76819 FETAL BIOPHYS PROFIL W/O NST: CPT | Mod: 59

## 2023-08-16 ENCOUNTER — NON-APPOINTMENT (OUTPATIENT)
Age: 23
End: 2023-08-16

## 2023-08-17 ENCOUNTER — APPOINTMENT (OUTPATIENT)
Dept: OBGYN | Facility: CLINIC | Age: 23
End: 2023-08-17
Payer: COMMERCIAL

## 2023-08-17 VITALS
WEIGHT: 164 LBS | DIASTOLIC BLOOD PRESSURE: 74 MMHG | SYSTOLIC BLOOD PRESSURE: 120 MMHG | HEIGHT: 65 IN | BODY MASS INDEX: 27.32 KG/M2

## 2023-08-17 PROCEDURE — 0502F SUBSEQUENT PRENATAL CARE: CPT

## 2023-08-22 ENCOUNTER — APPOINTMENT (OUTPATIENT)
Dept: ANTEPARTUM | Facility: CLINIC | Age: 23
End: 2023-08-22

## 2023-08-29 ENCOUNTER — APPOINTMENT (OUTPATIENT)
Dept: ANTEPARTUM | Facility: CLINIC | Age: 23
End: 2023-08-29
Payer: COMMERCIAL

## 2023-08-29 ENCOUNTER — ASOB RESULT (OUTPATIENT)
Age: 23
End: 2023-08-29

## 2023-08-29 PROCEDURE — 76819 FETAL BIOPHYS PROFIL W/O NST: CPT

## 2023-08-29 PROCEDURE — 76820 UMBILICAL ARTERY ECHO: CPT | Mod: 59

## 2023-08-29 PROCEDURE — 76816 OB US FOLLOW-UP PER FETUS: CPT

## 2023-09-01 ENCOUNTER — NON-APPOINTMENT (OUTPATIENT)
Age: 23
End: 2023-09-01

## 2023-09-05 ENCOUNTER — APPOINTMENT (OUTPATIENT)
Dept: ANTEPARTUM | Facility: CLINIC | Age: 23
End: 2023-09-05

## 2023-09-05 DIAGNOSIS — O21.9 VOMITING OF PREGNANCY, UNSPECIFIED: ICD-10-CM

## 2023-09-06 ENCOUNTER — NON-APPOINTMENT (OUTPATIENT)
Age: 23
End: 2023-09-06

## 2023-09-06 ENCOUNTER — APPOINTMENT (OUTPATIENT)
Dept: OBGYN | Facility: CLINIC | Age: 23
End: 2023-09-06
Payer: COMMERCIAL

## 2023-09-06 VITALS
SYSTOLIC BLOOD PRESSURE: 123 MMHG | WEIGHT: 162 LBS | BODY MASS INDEX: 26.99 KG/M2 | HEIGHT: 65 IN | DIASTOLIC BLOOD PRESSURE: 74 MMHG

## 2023-09-06 PROCEDURE — 0501F PRENATAL FLOW SHEET: CPT

## 2023-09-14 ENCOUNTER — APPOINTMENT (OUTPATIENT)
Dept: ANTEPARTUM | Facility: CLINIC | Age: 23
End: 2023-09-14
Payer: COMMERCIAL

## 2023-09-14 ENCOUNTER — ASOB RESULT (OUTPATIENT)
Age: 23
End: 2023-09-14

## 2023-09-14 PROCEDURE — 76819 FETAL BIOPHYS PROFIL W/O NST: CPT

## 2023-09-14 PROCEDURE — 76820 UMBILICAL ARTERY ECHO: CPT

## 2023-09-14 PROCEDURE — 93976 VASCULAR STUDY: CPT

## 2023-09-18 ENCOUNTER — APPOINTMENT (OUTPATIENT)
Dept: ANTEPARTUM | Facility: CLINIC | Age: 23
End: 2023-09-18

## 2023-09-18 ENCOUNTER — NON-APPOINTMENT (OUTPATIENT)
Age: 23
End: 2023-09-18

## 2023-09-20 ENCOUNTER — APPOINTMENT (OUTPATIENT)
Dept: OBGYN | Facility: CLINIC | Age: 23
End: 2023-09-20
Payer: COMMERCIAL

## 2023-09-20 VITALS
SYSTOLIC BLOOD PRESSURE: 124 MMHG | HEIGHT: 65 IN | BODY MASS INDEX: 27.32 KG/M2 | DIASTOLIC BLOOD PRESSURE: 84 MMHG | WEIGHT: 164 LBS

## 2023-09-20 DIAGNOSIS — F41.9 OTHER MENTAL DISORDERS COMPLICATING PREGNANCY, UNSPECIFIED TRIMESTER: ICD-10-CM

## 2023-09-20 DIAGNOSIS — O99.340 OTHER MENTAL DISORDERS COMPLICATING PREGNANCY, UNSPECIFIED TRIMESTER: ICD-10-CM

## 2023-09-20 DIAGNOSIS — Z34.93 ENCOUNTER FOR SUPERVISION OF NORMAL PREGNANCY, UNSPECIFIED, THIRD TRIMESTER: ICD-10-CM

## 2023-09-20 DIAGNOSIS — O36.5990 MATERNAL CARE FOR OTHER KNOWN OR SUSPECTED POOR FETAL GROWTH, UNSPECIFIED TRIMESTER, NOT APPLICABLE OR UNSPECIFIED: ICD-10-CM

## 2023-09-20 DIAGNOSIS — Z3A.21 21 WEEKS GESTATION OF PREGNANCY: ICD-10-CM

## 2023-09-20 DIAGNOSIS — Z34.92 ENCOUNTER FOR SUPERVISION OF NORMAL PREGNANCY, UNSPECIFIED, SECOND TRIMESTER: ICD-10-CM

## 2023-09-20 PROCEDURE — 0502F SUBSEQUENT PRENATAL CARE: CPT

## 2023-09-21 ENCOUNTER — APPOINTMENT (OUTPATIENT)
Dept: ANTEPARTUM | Facility: CLINIC | Age: 23
End: 2023-09-21

## 2023-09-25 ENCOUNTER — APPOINTMENT (OUTPATIENT)
Dept: ANTEPARTUM | Facility: CLINIC | Age: 23
End: 2023-09-25
Payer: COMMERCIAL

## 2023-09-25 ENCOUNTER — ASOB RESULT (OUTPATIENT)
Age: 23
End: 2023-09-25

## 2023-09-25 PROCEDURE — 93976 VASCULAR STUDY: CPT

## 2023-09-25 PROCEDURE — 76818 FETAL BIOPHYS PROFILE W/NST: CPT

## 2023-09-25 PROCEDURE — 76820 UMBILICAL ARTERY ECHO: CPT

## 2023-09-26 ENCOUNTER — NON-APPOINTMENT (OUTPATIENT)
Age: 23
End: 2023-09-26

## 2023-09-27 ENCOUNTER — NON-APPOINTMENT (OUTPATIENT)
Age: 23
End: 2023-09-27

## 2023-09-28 ENCOUNTER — NON-APPOINTMENT (OUTPATIENT)
Age: 23
End: 2023-09-28

## 2023-09-28 ENCOUNTER — APPOINTMENT (OUTPATIENT)
Dept: ANTEPARTUM | Facility: CLINIC | Age: 23
End: 2023-09-28

## 2023-09-29 ENCOUNTER — APPOINTMENT (OUTPATIENT)
Dept: OBGYN | Facility: CLINIC | Age: 23
End: 2023-09-29

## 2023-10-02 ENCOUNTER — APPOINTMENT (OUTPATIENT)
Dept: ANTEPARTUM | Facility: CLINIC | Age: 23
End: 2023-10-02

## 2023-10-05 ENCOUNTER — APPOINTMENT (OUTPATIENT)
Dept: ANTEPARTUM | Facility: CLINIC | Age: 23
End: 2023-10-05
Payer: COMMERCIAL

## 2023-10-05 ENCOUNTER — ASOB RESULT (OUTPATIENT)
Age: 23
End: 2023-10-05

## 2023-10-05 PROCEDURE — 76818 FETAL BIOPHYS PROFILE W/NST: CPT

## 2023-10-05 PROCEDURE — 76816 OB US FOLLOW-UP PER FETUS: CPT

## 2023-10-05 PROCEDURE — 76820 UMBILICAL ARTERY ECHO: CPT | Mod: 59

## 2023-10-06 ENCOUNTER — NON-APPOINTMENT (OUTPATIENT)
Age: 23
End: 2023-10-06

## 2023-10-09 ENCOUNTER — APPOINTMENT (OUTPATIENT)
Dept: ANTEPARTUM | Facility: CLINIC | Age: 23
End: 2023-10-09

## 2023-10-10 ENCOUNTER — NON-APPOINTMENT (OUTPATIENT)
Age: 23
End: 2023-10-10

## 2023-10-10 ENCOUNTER — OUTPATIENT (OUTPATIENT)
Dept: INPATIENT UNIT | Facility: HOSPITAL | Age: 23
LOS: 1 days | End: 2023-10-10
Payer: COMMERCIAL

## 2023-10-10 ENCOUNTER — APPOINTMENT (OUTPATIENT)
Dept: OBGYN | Facility: CLINIC | Age: 23
End: 2023-10-10
Payer: COMMERCIAL

## 2023-10-10 VITALS — SYSTOLIC BLOOD PRESSURE: 135 MMHG | DIASTOLIC BLOOD PRESSURE: 85 MMHG | HEART RATE: 73 BPM

## 2023-10-10 VITALS — DIASTOLIC BLOOD PRESSURE: 77 MMHG | HEART RATE: 104 BPM | SYSTOLIC BLOOD PRESSURE: 129 MMHG

## 2023-10-10 VITALS
SYSTOLIC BLOOD PRESSURE: 148 MMHG | BODY MASS INDEX: 28.66 KG/M2 | WEIGHT: 172 LBS | DIASTOLIC BLOOD PRESSURE: 99 MMHG | HEIGHT: 65 IN

## 2023-10-10 DIAGNOSIS — Z3A.36 36 WEEKS GESTATION OF PREGNANCY: ICD-10-CM

## 2023-10-10 DIAGNOSIS — O36.5930 MATERNAL CARE FOR OTHER KNOWN OR SUSPECTED POOR FETAL GROWTH, THIRD TRIMESTER, NOT APPLICABLE OR UNSPECIFIED: ICD-10-CM

## 2023-10-10 DIAGNOSIS — O26.899 OTHER SPECIFIED PREGNANCY RELATED CONDITIONS, UNSPECIFIED TRIMESTER: ICD-10-CM

## 2023-10-10 DIAGNOSIS — R03.0 ELEVATED BLOOD-PRESSURE READING, WITHOUT DIAGNOSIS OF HYPERTENSION: ICD-10-CM

## 2023-10-10 DIAGNOSIS — O26.893 OTHER SPECIFIED PREGNANCY RELATED CONDITIONS, THIRD TRIMESTER: ICD-10-CM

## 2023-10-10 LAB
ALBUMIN SERPL ELPH-MCNC: 3.4 G/DL — SIGNIFICANT CHANGE UP (ref 3.3–5.2)
ALP SERPL-CCNC: 203 U/L — HIGH (ref 40–120)
ALT FLD-CCNC: 15 U/L — SIGNIFICANT CHANGE UP
ANION GAP SERPL CALC-SCNC: 15 MMOL/L — SIGNIFICANT CHANGE UP (ref 5–17)
APPEARANCE UR: CLEAR — SIGNIFICANT CHANGE UP
APTT BLD: 27.4 SEC — SIGNIFICANT CHANGE UP (ref 24.5–35.6)
AST SERPL-CCNC: 24 U/L — SIGNIFICANT CHANGE UP
BACTERIA # UR AUTO: ABNORMAL
BASOPHILS # BLD AUTO: 0.04 K/UL — SIGNIFICANT CHANGE UP (ref 0–0.2)
BASOPHILS NFR BLD AUTO: 0.4 % — SIGNIFICANT CHANGE UP (ref 0–2)
BILIRUB SERPL-MCNC: 0.4 MG/DL — SIGNIFICANT CHANGE UP (ref 0.4–2)
BILIRUB UR-MCNC: NEGATIVE — SIGNIFICANT CHANGE UP
BUN SERPL-MCNC: 8.6 MG/DL — SIGNIFICANT CHANGE UP (ref 8–20)
CALCIUM SERPL-MCNC: 9.1 MG/DL — SIGNIFICANT CHANGE UP (ref 8.4–10.5)
CHLORIDE SERPL-SCNC: 104 MMOL/L — SIGNIFICANT CHANGE UP (ref 96–108)
CO2 SERPL-SCNC: 20 MMOL/L — LOW (ref 22–29)
COLOR SPEC: YELLOW — SIGNIFICANT CHANGE UP
CREAT ?TM UR-MCNC: 185 MG/DL — SIGNIFICANT CHANGE UP
CREAT SERPL-MCNC: 0.65 MG/DL — SIGNIFICANT CHANGE UP (ref 0.5–1.3)
DIFF PNL FLD: NEGATIVE — SIGNIFICANT CHANGE UP
EGFR: 126 ML/MIN/1.73M2 — SIGNIFICANT CHANGE UP
EOSINOPHIL # BLD AUTO: 0.31 K/UL — SIGNIFICANT CHANGE UP (ref 0–0.5)
EOSINOPHIL NFR BLD AUTO: 3.2 % — SIGNIFICANT CHANGE UP (ref 0–6)
EPI CELLS # UR: SIGNIFICANT CHANGE UP
FIBRINOGEN PPP-MCNC: 453 MG/DL — HIGH (ref 200–450)
GLUCOSE SERPL-MCNC: 94 MG/DL — SIGNIFICANT CHANGE UP (ref 70–99)
GLUCOSE UR QL: NEGATIVE MG/DL — SIGNIFICANT CHANGE UP
HCT VFR BLD CALC: 32.1 % — LOW (ref 34.5–45)
HGB BLD-MCNC: 11 G/DL — LOW (ref 11.5–15.5)
IMM GRANULOCYTES NFR BLD AUTO: 0.9 % — SIGNIFICANT CHANGE UP (ref 0–0.9)
INR BLD: 0.83 RATIO — LOW (ref 0.85–1.18)
KETONES UR-MCNC: NEGATIVE — SIGNIFICANT CHANGE UP
LDH SERPL L TO P-CCNC: 264 U/L — HIGH (ref 98–192)
LEUKOCYTE ESTERASE UR-ACNC: ABNORMAL
LYMPHOCYTES # BLD AUTO: 1.92 K/UL — SIGNIFICANT CHANGE UP (ref 1–3.3)
LYMPHOCYTES # BLD AUTO: 19.6 % — SIGNIFICANT CHANGE UP (ref 13–44)
MCHC RBC-ENTMCNC: 30.2 PG — SIGNIFICANT CHANGE UP (ref 27–34)
MCHC RBC-ENTMCNC: 34.3 GM/DL — SIGNIFICANT CHANGE UP (ref 32–36)
MCV RBC AUTO: 88.2 FL — SIGNIFICANT CHANGE UP (ref 80–100)
MONOCYTES # BLD AUTO: 0.56 K/UL — SIGNIFICANT CHANGE UP (ref 0–0.9)
MONOCYTES NFR BLD AUTO: 5.7 % — SIGNIFICANT CHANGE UP (ref 2–14)
NEUTROPHILS # BLD AUTO: 6.87 K/UL — SIGNIFICANT CHANGE UP (ref 1.8–7.4)
NEUTROPHILS NFR BLD AUTO: 70.2 % — SIGNIFICANT CHANGE UP (ref 43–77)
NITRITE UR-MCNC: NEGATIVE — SIGNIFICANT CHANGE UP
PH UR: 7 — SIGNIFICANT CHANGE UP (ref 5–8)
PLATELET # BLD AUTO: 287 K/UL — SIGNIFICANT CHANGE UP (ref 150–400)
POTASSIUM SERPL-MCNC: 4.1 MMOL/L — SIGNIFICANT CHANGE UP (ref 3.5–5.3)
POTASSIUM SERPL-SCNC: 4.1 MMOL/L — SIGNIFICANT CHANGE UP (ref 3.5–5.3)
PROT ?TM UR-MCNC: 20 MG/DL — HIGH (ref 0–12)
PROT ?TM UR-MCNC: 20 MG/DL — HIGH (ref 0–12)
PROT SERPL-MCNC: 6.4 G/DL — LOW (ref 6.6–8.7)
PROT UR-MCNC: 15
PROT/CREAT UR-RTO: 0.1 RATIO — SIGNIFICANT CHANGE UP
PROTHROM AB SERPL-ACNC: 9.3 SEC — LOW (ref 9.5–13)
RBC # BLD: 3.64 M/UL — LOW (ref 3.8–5.2)
RBC # FLD: 14.2 % — SIGNIFICANT CHANGE UP (ref 10.3–14.5)
RBC CASTS # UR COMP ASSIST: SIGNIFICANT CHANGE UP /HPF (ref 0–4)
SODIUM SERPL-SCNC: 139 MMOL/L — SIGNIFICANT CHANGE UP (ref 135–145)
SP GR SPEC: 1.01 — SIGNIFICANT CHANGE UP (ref 1.01–1.02)
URATE SERPL-MCNC: 5.8 MG/DL — HIGH (ref 2.4–5.7)
UROBILINOGEN FLD QL: NEGATIVE MG/DL — SIGNIFICANT CHANGE UP
WBC # BLD: 9.79 K/UL — SIGNIFICANT CHANGE UP (ref 3.8–10.5)
WBC # FLD AUTO: 9.79 K/UL — SIGNIFICANT CHANGE UP (ref 3.8–10.5)
WBC UR QL: SIGNIFICANT CHANGE UP /HPF (ref 0–5)

## 2023-10-10 PROCEDURE — 84550 ASSAY OF BLOOD/URIC ACID: CPT

## 2023-10-10 PROCEDURE — 83615 LACTATE (LD) (LDH) ENZYME: CPT

## 2023-10-10 PROCEDURE — 81001 URINALYSIS AUTO W/SCOPE: CPT

## 2023-10-10 PROCEDURE — 85730 THROMBOPLASTIN TIME PARTIAL: CPT

## 2023-10-10 PROCEDURE — 0502F SUBSEQUENT PRENATAL CARE: CPT

## 2023-10-10 PROCEDURE — 85610 PROTHROMBIN TIME: CPT

## 2023-10-10 PROCEDURE — 82570 ASSAY OF URINE CREATININE: CPT

## 2023-10-10 PROCEDURE — 85025 COMPLETE CBC W/AUTO DIFF WBC: CPT

## 2023-10-10 PROCEDURE — G0463: CPT

## 2023-10-10 PROCEDURE — 84156 ASSAY OF PROTEIN URINE: CPT

## 2023-10-10 PROCEDURE — 36415 COLL VENOUS BLD VENIPUNCTURE: CPT

## 2023-10-10 PROCEDURE — 80053 COMPREHEN METABOLIC PANEL: CPT

## 2023-10-10 PROCEDURE — 85384 FIBRINOGEN ACTIVITY: CPT

## 2023-10-10 RX ORDER — FERROUS SULFATE 325(65) MG
1 TABLET ORAL
Qty: 30 | Refills: 0
Start: 2023-10-10 | End: 2023-11-08

## 2023-10-10 NOTE — OB PROVIDER TRIAGE NOTE - NSOBPROVIDERNOTE_OBGYN_ALL_OB_FT
ASSESSMENT/PLAN:   ASTON AVILA is a 23y  at 36wk6d GA by LMP who presents to L&D from her OB's office for elevated BP. Otherwise, she has no history of hypertension throughout this pregnancy and has not been on ASA prophylaxis. She denies any symptoms of end-organ damage, but reports proteinuria noted on her urgent care urinalysis 5 days ago.     Here, her BP has been over 140 mmHg systolic once. Other systolic BP measurements have ranged from 120s-130s.    #Hypertension in pregnancy  - Order labs to r/o preeclampsia, including CBC, CMP, Upc, and UA    EFM:   [place]:  Dispo: Continue to observe.     Discussed with  ASSESSMENT/PLAN:   ASTON AVILA is a 23y  at 36wk6d GA by LMP who presents to L&D from her OB's office for elevated BP. Otherwise, she has no history of hypertension throughout this pregnancy and has not been on ASA prophylaxis. She denies any symptoms of end-organ damage, but reports proteinuria noted on her urgent care urinalysis 5 days ago.     Here, her BP has been over 140 mmHg systolic once. Other systolic BP measurements have ranged from 120s-130s.     Patient does not meet diagnostic criteria for preeclampsia or preeclampsia with severe features. Patient only meets criteria for gestational hypertension. Recommendation is to deliver at 37 weeks.    #Hypertension in pregnancy  - Order labs to r/o preeclampsia, including CBC, CMP, Upc, and UA    EFM:   [place]:  Dispo: Continue to observe.     Discussed with  ASSESSMENT/PLAN:   ASTON AVILA is a 23y  at 36wk6d GA by LMP who presents to L&D from her OB's office for elevated BP. Otherwise, she has no history of hypertension throughout this pregnancy and has not been on ASA prophylaxis. She denies any symptoms of end-organ damage, but reports proteinuria noted on her urgent care urinalysis 5 days ago.     Here, her BP has been over 140 mmHg systolic once. Other systolic BP measurements have ranged from 120s-130s.     Patient does not meet diagnostic criteria for preeclampsia or preeclampsia with severe features. Patient requires 2 moderate range BP readings 4 hours apart to meet criteria for gestational hypertension, for which the recommendation is to deliver at 37 weeks. Patient's first moderate range BP reading during this pregnancy was 148/99 mmHg at 11:30 today.     #Hypertension in pregnancy  - Order labs to r/o preeclampsia, including CBC, CMP, Upc, and UA    EFM:   [place]:  Dispo: Continue to observe.     Discussed with  ASSESSMENT/PLAN:   ASTON AVILA is a 23y  at 36wk6d GA by LMP who presents to L&D from her OB's office for elevated BP. Otherwise, she has no history of hypertension throughout this pregnancy and has not been on ASA prophylaxis. She denies any symptoms of end-organ damage, but reports proteinuria noted on her urgent care urinalysis 5 days ago.     Patient does not meet diagnostic criteria for preeclampsia or preeclampsia with severe features. Patient requires 2 moderate range BP readings 4 hours apart to meet criteria for gestational hypertension, for which the recommendation is to deliver at 37 weeks. Patient's first moderate range BP reading during this pregnancy was 148/99 mmHg at 11:30 today.     #Hypertension in pregnancy  - Order labs to r/o preeclampsia, including CBC, CMP, Upc, and UA    EFM:   [place]:  Dispo: Continue to observe.     Discussed with  ASSESSMENT/PLAN:   ASTON AVILA is a 23y  at 36wk6d GA by LMP who presents to L&D from her OB's office for elevated BP. Otherwise, she has no history of hypertension throughout this pregnancy and has not been on ASA prophylaxis. She denies any symptoms of end-organ damage, but reports proteinuria noted on her urgent care urinalysis 5 days ago.     Patient does not meet diagnostic criteria for preeclampsia or preeclampsia with severe features. Patient requires 2 moderate range BP readings 4 hours apart to meet criteria for gestational hypertension, for which the recommendation is to deliver at 37 weeks. Patient's first moderate range BP reading during this pregnancy was 148/99 mmHg at 11:30 today. She has had systolic BPs in the 140s while here twice, but her BP has not fallen in moderate range since 15:30. Therefore, she also does not meet criteria for gestational hypertension.    Plan for patient to follow up with her OB outpatient for evaluation and BP check. Spoke with patient's OB about plan.     #Hypertension in pregnancy  - Order labs to r/o preeclampsia, including CBC, CMP, Upc, and UA  - Monitor BP q15 minutes    Discussed with Dr. Valadez. A/P: 23y  at 36wk6d GA evaluated for r/o PEC    Patient does not meet diagnostic criteria for preeclampsia or preeclampsia with severe features. Patient requires 2 moderate range BP readings 4 hours apart to meet criteria for gestational hypertension, for which the recommendation is to deliver at 37 weeks. Patient's first moderate range BP reading during this pregnancy was 148/99 mmHg at 11:30 today. She has had systolic BPs in the 140s while here twice, but her BP has not fallen in moderate range since 15:30. Therefore, she also does not meet criteria for gestational hypertension.    Plan for patient to follow up with her OB outpatient for evaluation and BP check. Spoke with patient's OB about plan.       Discussed with Dr. Valadez.

## 2023-10-10 NOTE — OB PROVIDER TRIAGE NOTE - HISTORY OF PRESENT ILLNESS
SUBJECTIVE:  ASTON AVILA is a 23y  at 36wk 6d GA by LMP who presents to L&D from her OB's office for elevated BP. Patient reports no history of gestational or chronic hypertension. Today, patient says she was experiencing some life stressors prior to her OB appointment. At her appointment, her BP was noted to be 148/99 mmHg, prompting visit to L&D. Patient denies any chest pain, headache, vision changes, changes in her urination, or palpitations. Patient reports some lower leg swelling and shortness of breath, but notes these have been present throughout her pregnancy and are not new in onset. However, patient was sick last week with flu-like symptoms and went to urgent care 5 days ago, where her urine was shown to demonstrate proteinuria. At this time, she did not have elevated blood pressure, headache, or vision changes and was sent home.    Pregnancy course:   Fetal growth restriction, for which she follows with MFM. As per patient, there was discussion of induction of labor at 37-38 weeks for FGR; however, her most recent ultrasound demonstrated size >10th percentile. Patient has scheduled  for next week due to her desire to not be induced.  Patient has used marijuana throughout her pregnancy.   Patient has no history of gestational diabetes or ASA use during pregnancy. Patient is unaware if she has received Tdap or had a GBS swab. Patient denied flu vaccine.    OBHx:   GYNHx: Denies history of fibroids, ovarian cysts, STIs, abnormal pap smears   PMHx: History of benzodiazepine-withdrawal seizure  PSHx: Denies  SocHx: History of social alcohol use and recreational drug use prior to pregnancy, history of marijuana use during pregnancy, feels safe at home   Meds: PNVs  Allergies: NKDA SUBJECTIVE:  ASTON AVILA is a 23y  at 36wk 6d GA by LMP who presents to L&D from her OB's office for elevated BP. Patient reports no history of gestational or chronic hypertension. Today, patient says she was experiencing some life stressors prior to her OB appointment. At her appointment, her BP was noted to be 148/99 mmHg, prompting visit to L&D. Patient denies any chest pain, headache, vision changes, changes in her urination, or palpitations. Patient reports some lower leg swelling and shortness of breath, but notes these have been present throughout her pregnancy and are not new in onset. However, patient was sick last week with flu-like symptoms and went to urgent care 5 days ago, where her urine was shown to demonstrate proteinuria. At this time, she did not have elevated blood pressure, headache, or vision changes and was sent home.    Pregnancy course:   Fetal growth restriction, for which she follows with MFM. As per patient, there was discussion of induction of labor at 37-38 weeks for FGR. Her most recent ultrasound demonstrated fetal size at the 10th percentile.   Patient has used marijuana throughout her pregnancy.   Patient has no history of gestational diabetes or ASA use during pregnancy. Patient is unaware if she has received Tdap or had a GBS swab. Patient denied flu vaccine.    OBHx:   GYNHx: Denies history of fibroids, ovarian cysts, STIs, abnormal pap smears   PMHx: History of benzodiazepine-withdrawal seizure  PSHx: Denies  SocHx: History of social alcohol use and recreational drug use prior to pregnancy, history of marijuana use during pregnancy, feels safe at home   Meds: PNVs  Allergies: NKDA ASTON AVILA is a 23y  at 36wk 6d GA by LMP who presents to L&D from her OB's office for elevated BP. Patient reports no history of gestational or chronic hypertension. Today, patient says she was experiencing some life stressors prior to her OB appointment. At her appointment, her BP was noted to be 148/99 mmHg, prompting visit to L&D. Patient denies any chest pain, headache, vision changes, changes in her urination, or palpitations. Patient reports some lower leg swelling and shortness of breath, but notes these have been present throughout her pregnancy and are not new in onset. However, patient was sick last week with flu-like symptoms and went to urgent care 5 days ago, where her urine was shown to demonstrate proteinuria. At this time, she did not have elevated blood pressure, headache, or vision changes and was sent home.    Pregnancy course:   Fetal growth restriction, for which she follows with MFM. As per patient, there was discussion of induction of labor at 37-38 weeks for FGR. Her most recent ultrasound EFW 10th percentile.   h/o  marijuana use in early pregnancy      OBHx:   GYNHx: Denies history of fibroids, ovarian cysts, STIs, abnormal pap smears   PMHx: denies  PSHx: Denies  SocHx: History of social alcohol use and recreational drug use prior to pregnancy, history of marijuana use during early pregnancy, feels safe at home   Meds: PNVs  Allergies: NKDA

## 2023-10-10 NOTE — OB RN TRIAGE NOTE - FALL HARM RISK - UNIVERSAL INTERVENTIONS
Bed in lowest position, wheels locked, appropriate side rails in place/Call bell, personal items and telephone in reach/Instruct patient to call for assistance before getting out of bed or chair/Non-slip footwear when patient is out of bed/[place] to call system/Physically safe environment - no spills, clutter or unnecessary equipment/Purposeful Proactive Rounding/Room/bathroom lighting operational, light cord in reach

## 2023-10-10 NOTE — OB PROVIDER TRIAGE NOTE - NSHPLABSRESULTS_GEN_ALL_CORE
11.0   9.79  )-----------( 287      ( 10 Oct 2023 13:19 )             32.1 Labs:                        11.0   9.79  )-----------( 287      ( 10 Oct 2023 13:19 )             32.1    Aspartate Aminotransferase (AST/SGOT): 24 U/L (10.10.23 @ 13:19)   Alanine Aminotransferase (ALT/SGPT): 15 U/L (10.10.23 @ 13:19)     Creatinine: 0.65 mg/dL (10.10.23 @ 13:19)    Total Protein, Random Urine: 20.0 mg/dL (10.10.23 @ 13:32)     Urinalysis (10.10.23 @ 13:19)   pH Urine: 7.0  Glucose Qualitative, Urine: Negative mg/dL  Blood, Urine: Negative  Color: Yellow  Urine Appearance: Clear  Bilirubin: Negative  Ketone - Urine: Negative  Specific Gravity: 1.015  Protein, Urine: 15  Urobilinogen: Negative mg/dL  Nitrite: Negative  Leukocyte Esterase Concentration: Trace Labs:                        11.0   9.79  )-----------( 287      ( 10 Oct 2023 13:19 )             32.1    Aspartate Aminotransferase (AST/SGOT): 24 U/L (10.10.23 @ 13:19)   Alanine Aminotransferase (ALT/SGPT): 15 U/L (10.10.23 @ 13:19)     Creatinine: 0.65 mg/dL (10.10.23 @ 13:19)    Total Protein, Random Urine: 20.0 mg/dL (10.10.23 @ 13:32)  Creatinine, Random Urine: 185 (10.10.23 @ 13:32)   Protein/Creatinine Ratio Calculation: 0.1 Ratio (10.10.23 @ 13:32)

## 2023-10-10 NOTE — OB PROVIDER TRIAGE NOTE - NSHPPHYSICALEXAM_GEN_ALL_CORE
OBJECTIVE:  Physical Exam:  Gen: NAD, well-appearing  Heart: S1/S2 auscultated with regular rate, normal rhythm.  Lungs: CTAB, no crackles or wheezing  Abd: soft, gravid, nontender  Ext: non-edematous, non-tender   SVE: deferred at this time  Bedside sono:     FHT:   [place]: no ctx OBJECTIVE:    Physical Exam:  Gen: NAD, well-appearing  Heart: S1/S2 auscultated with regular rate, normal rhythm.  Lungs: CTAB, no crackles or wheezing  Abd: soft, gravid, nontender  Ext: non-edematous, non-tender   SVE: deferred at this time Physical Exam:  Gen: NAD, well-appearing  Lungs: Breathing comfortably on RA  Abd: soft, gravid, nontender  Ext: non-edematous, non-tender   SVE: deferred

## 2023-10-11 ENCOUNTER — APPOINTMENT (OUTPATIENT)
Dept: OBGYN | Facility: CLINIC | Age: 23
End: 2023-10-11
Payer: COMMERCIAL

## 2023-10-11 VITALS — HEIGHT: 65 IN | SYSTOLIC BLOOD PRESSURE: 140 MMHG | DIASTOLIC BLOOD PRESSURE: 98 MMHG

## 2023-10-11 PROCEDURE — 0502F SUBSEQUENT PRENATAL CARE: CPT

## 2023-10-12 ENCOUNTER — APPOINTMENT (OUTPATIENT)
Dept: ANTEPARTUM | Facility: CLINIC | Age: 23
End: 2023-10-12
Payer: COMMERCIAL

## 2023-10-12 ENCOUNTER — APPOINTMENT (OUTPATIENT)
Dept: OBGYN | Facility: HOSPITAL | Age: 23
End: 2023-10-12

## 2023-10-12 ENCOUNTER — APPOINTMENT (OUTPATIENT)
Dept: ANTEPARTUM | Facility: CLINIC | Age: 23
End: 2023-10-12

## 2023-10-12 ENCOUNTER — ASOB RESULT (OUTPATIENT)
Age: 23
End: 2023-10-12

## 2023-10-12 PROCEDURE — 76818 FETAL BIOPHYS PROFILE W/NST: CPT

## 2023-10-12 PROCEDURE — 76820 UMBILICAL ARTERY ECHO: CPT

## 2023-10-13 ENCOUNTER — RESULT REVIEW (OUTPATIENT)
Age: 23
End: 2023-10-13

## 2023-10-13 ENCOUNTER — INPATIENT (INPATIENT)
Facility: HOSPITAL | Age: 23
LOS: 2 days | Discharge: HOME | End: 2023-10-16
Payer: COMMERCIAL

## 2023-10-13 ENCOUNTER — NON-APPOINTMENT (OUTPATIENT)
Age: 23
End: 2023-10-13

## 2023-10-13 VITALS
DIASTOLIC BLOOD PRESSURE: 73 MMHG | RESPIRATION RATE: 18 BRPM | SYSTOLIC BLOOD PRESSURE: 141 MMHG | HEART RATE: 72 BPM | TEMPERATURE: 99 F | HEIGHT: 65 IN | WEIGHT: 171.96 LBS

## 2023-10-13 DIAGNOSIS — O26.899 OTHER SPECIFIED PREGNANCY RELATED CONDITIONS, UNSPECIFIED TRIMESTER: ICD-10-CM

## 2023-10-13 DIAGNOSIS — O26.893 OTHER SPECIFIED PREGNANCY RELATED CONDITIONS, THIRD TRIMESTER: ICD-10-CM

## 2023-10-13 LAB
ALBUMIN SERPL ELPH-MCNC: 3.6 G/DL — SIGNIFICANT CHANGE UP (ref 3.3–5.2)
ALP SERPL-CCNC: 217 U/L — HIGH (ref 40–120)
ALT FLD-CCNC: 13 U/L — SIGNIFICANT CHANGE UP
ANION GAP SERPL CALC-SCNC: 17 MMOL/L — SIGNIFICANT CHANGE UP (ref 5–17)
APPEARANCE UR: CLEAR — SIGNIFICANT CHANGE UP
APTT BLD: 26.4 SEC — SIGNIFICANT CHANGE UP (ref 24.5–35.6)
AST SERPL-CCNC: 24 U/L — SIGNIFICANT CHANGE UP
BACTERIA # UR AUTO: ABNORMAL
BASOPHILS # BLD AUTO: 0.03 K/UL — SIGNIFICANT CHANGE UP (ref 0–0.2)
BASOPHILS NFR BLD AUTO: 0.3 % — SIGNIFICANT CHANGE UP (ref 0–2)
BILIRUB SERPL-MCNC: 0.5 MG/DL — SIGNIFICANT CHANGE UP (ref 0.4–2)
BILIRUB UR-MCNC: NEGATIVE — SIGNIFICANT CHANGE UP
BLD GP AB SCN SERPL QL: SIGNIFICANT CHANGE UP
BUN SERPL-MCNC: 13.4 MG/DL — SIGNIFICANT CHANGE UP (ref 8–20)
CALCIUM SERPL-MCNC: 8.9 MG/DL — SIGNIFICANT CHANGE UP (ref 8.4–10.5)
CHLORIDE SERPL-SCNC: 101 MMOL/L — SIGNIFICANT CHANGE UP (ref 96–108)
CO2 SERPL-SCNC: 18 MMOL/L — LOW (ref 22–29)
COLOR SPEC: YELLOW — SIGNIFICANT CHANGE UP
CREAT ?TM UR-MCNC: 189 MG/DL — SIGNIFICANT CHANGE UP
CREAT SERPL-MCNC: 0.58 MG/DL — SIGNIFICANT CHANGE UP (ref 0.5–1.3)
DIFF PNL FLD: NEGATIVE — SIGNIFICANT CHANGE UP
EGFR: 130 ML/MIN/1.73M2 — SIGNIFICANT CHANGE UP
EOSINOPHIL # BLD AUTO: 0.1 K/UL — SIGNIFICANT CHANGE UP (ref 0–0.5)
EOSINOPHIL NFR BLD AUTO: 0.9 % — SIGNIFICANT CHANGE UP (ref 0–6)
EPI CELLS # UR: SIGNIFICANT CHANGE UP
FIBRINOGEN PPP-MCNC: 433 MG/DL — SIGNIFICANT CHANGE UP (ref 200–450)
GLUCOSE SERPL-MCNC: 70 MG/DL — SIGNIFICANT CHANGE UP (ref 70–99)
GLUCOSE UR QL: NEGATIVE MG/DL — SIGNIFICANT CHANGE UP
HCT VFR BLD CALC: 31.5 % — LOW (ref 34.5–45)
HGB BLD-MCNC: 10.7 G/DL — LOW (ref 11.5–15.5)
HIV 1 & 2 AB SERPL IA.RAPID: SIGNIFICANT CHANGE UP
HIV 1+2 AB+HIV1 P24 AG SERPL QL IA: SIGNIFICANT CHANGE UP
IMM GRANULOCYTES NFR BLD AUTO: 0.4 % — SIGNIFICANT CHANGE UP (ref 0–0.9)
INR BLD: 0.9 RATIO — SIGNIFICANT CHANGE UP (ref 0.85–1.18)
KETONES UR-MCNC: ABNORMAL
LDH SERPL L TO P-CCNC: 276 U/L — HIGH (ref 98–192)
LEUKOCYTE ESTERASE UR-ACNC: ABNORMAL
LYMPHOCYTES # BLD AUTO: 19.9 % — SIGNIFICANT CHANGE UP (ref 13–44)
LYMPHOCYTES # BLD AUTO: 2.15 K/UL — SIGNIFICANT CHANGE UP (ref 1–3.3)
MCHC RBC-ENTMCNC: 30.1 PG — SIGNIFICANT CHANGE UP (ref 27–34)
MCHC RBC-ENTMCNC: 34 GM/DL — SIGNIFICANT CHANGE UP (ref 32–36)
MCV RBC AUTO: 88.7 FL — SIGNIFICANT CHANGE UP (ref 80–100)
MONOCYTES # BLD AUTO: 0.84 K/UL — SIGNIFICANT CHANGE UP (ref 0–0.9)
MONOCYTES NFR BLD AUTO: 7.8 % — SIGNIFICANT CHANGE UP (ref 2–14)
NEUTROPHILS # BLD AUTO: 7.65 K/UL — HIGH (ref 1.8–7.4)
NEUTROPHILS NFR BLD AUTO: 70.7 % — SIGNIFICANT CHANGE UP (ref 43–77)
NITRITE UR-MCNC: NEGATIVE — SIGNIFICANT CHANGE UP
PH UR: 6.5 — SIGNIFICANT CHANGE UP (ref 5–8)
PLATELET # BLD AUTO: 296 K/UL — SIGNIFICANT CHANGE UP (ref 150–400)
POTASSIUM SERPL-MCNC: 3.5 MMOL/L — SIGNIFICANT CHANGE UP (ref 3.5–5.3)
POTASSIUM SERPL-SCNC: 3.5 MMOL/L — SIGNIFICANT CHANGE UP (ref 3.5–5.3)
PROT ?TM UR-MCNC: 29 MG/DL — HIGH (ref 0–12)
PROT ?TM UR-MCNC: 29 MG/DL — HIGH (ref 0–12)
PROT SERPL-MCNC: 6.7 G/DL — SIGNIFICANT CHANGE UP (ref 6.6–8.7)
PROT UR-MCNC: NEGATIVE — SIGNIFICANT CHANGE UP
PROT/CREAT UR-RTO: 0.2 RATIO — SIGNIFICANT CHANGE UP
PROTHROM AB SERPL-ACNC: 10 SEC — SIGNIFICANT CHANGE UP (ref 9.5–13)
RBC # BLD: 3.55 M/UL — LOW (ref 3.8–5.2)
RBC # FLD: 14.1 % — SIGNIFICANT CHANGE UP (ref 10.3–14.5)
RBC CASTS # UR COMP ASSIST: SIGNIFICANT CHANGE UP /HPF (ref 0–4)
SODIUM SERPL-SCNC: 136 MMOL/L — SIGNIFICANT CHANGE UP (ref 135–145)
SP GR SPEC: 1.01 — SIGNIFICANT CHANGE UP (ref 1.01–1.02)
T PALLIDUM AB TITR SER: NEGATIVE — SIGNIFICANT CHANGE UP
URATE SERPL-MCNC: 5.7 MG/DL — SIGNIFICANT CHANGE UP (ref 2.4–5.7)
UROBILINOGEN FLD QL: NEGATIVE MG/DL — SIGNIFICANT CHANGE UP
WBC # BLD: 10.81 K/UL — HIGH (ref 3.8–10.5)
WBC # FLD AUTO: 10.81 K/UL — HIGH (ref 3.8–10.5)
WBC UR QL: ABNORMAL /HPF (ref 0–5)

## 2023-10-13 RX ORDER — OXYTOCIN 10 UNIT/ML
VIAL (ML) INJECTION
Qty: 30 | Refills: 0 | Status: DISCONTINUED | OUTPATIENT
Start: 2023-10-13 | End: 2023-10-14

## 2023-10-13 RX ORDER — SODIUM CHLORIDE 9 MG/ML
1000 INJECTION, SOLUTION INTRAVENOUS
Refills: 0 | Status: DISCONTINUED | OUTPATIENT
Start: 2023-10-13 | End: 2023-10-14

## 2023-10-13 RX ORDER — CHLORHEXIDINE GLUCONATE 213 G/1000ML
1 SOLUTION TOPICAL DAILY
Refills: 0 | Status: DISCONTINUED | OUTPATIENT
Start: 2023-10-13 | End: 2023-10-14

## 2023-10-13 RX ORDER — DINOPROSTONE 10 MG/241MG
10 INSERT VAGINAL ONCE
Refills: 0 | Status: COMPLETED | OUTPATIENT
Start: 2023-10-13 | End: 2023-10-13

## 2023-10-13 RX ORDER — OXYTOCIN 10 UNIT/ML
333.33 VIAL (ML) INJECTION
Qty: 20 | Refills: 0 | Status: COMPLETED | OUTPATIENT
Start: 2023-10-13 | End: 2023-10-13

## 2023-10-13 RX ORDER — ONDANSETRON 8 MG/1
4 TABLET, FILM COATED ORAL ONCE
Refills: 0 | Status: COMPLETED | OUTPATIENT
Start: 2023-10-13 | End: 2023-10-13

## 2023-10-13 RX ORDER — CITRIC ACID/SODIUM CITRATE 300-500 MG
30 SOLUTION, ORAL ORAL ONCE
Refills: 0 | Status: COMPLETED | OUTPATIENT
Start: 2023-10-13 | End: 2023-10-13

## 2023-10-13 RX ORDER — INFLUENZA VIRUS VACCINE 15; 15; 15; 15 UG/.5ML; UG/.5ML; UG/.5ML; UG/.5ML
0.5 SUSPENSION INTRAMUSCULAR ONCE
Refills: 0 | Status: COMPLETED | OUTPATIENT
Start: 2023-10-13 | End: 2023-10-13

## 2023-10-13 RX ADMIN — SODIUM CHLORIDE 125 MILLILITER(S): 9 INJECTION, SOLUTION INTRAVENOUS at 16:59

## 2023-10-13 RX ADMIN — ONDANSETRON 4 MILLIGRAM(S): 8 TABLET, FILM COATED ORAL at 23:45

## 2023-10-13 RX ADMIN — SODIUM CHLORIDE 125 MILLILITER(S): 9 INJECTION, SOLUTION INTRAVENOUS at 21:11

## 2023-10-13 RX ADMIN — Medication 2 MILLIUNIT(S)/MIN: at 17:23

## 2023-10-13 RX ADMIN — SODIUM CHLORIDE 125 MILLILITER(S): 9 INJECTION, SOLUTION INTRAVENOUS at 04:45

## 2023-10-13 RX ADMIN — Medication 30 MILLILITER(S): at 17:06

## 2023-10-13 RX ADMIN — DINOPROSTONE 10 MILLIGRAM(S): 10 INSERT VAGINAL at 04:41

## 2023-10-13 RX ADMIN — Medication 2 MILLIUNIT(S)/MIN: at 14:45

## 2023-10-13 NOTE — OB PROVIDER H&P - NSLOWPPHRISK_OBGYN_A_OB
No previous uterine incision/Nice Pregnancy/Less than or equal to 4 previous vaginal births/No known bleeding disorder/No history of postpartum hemorrhage/No other PPH risks indicated

## 2023-10-13 NOTE — OB PROVIDER H&P - HISTORY OF PRESENT ILLNESS
ASTON AVILA is a 23y  at 37w2d GA by LMP who presents to L&D on MFM recommendation for IOL for gHTN and FGR. Patient reports no history of chronic hypertension.  patient denies ctx, VB, leakage of fluid. She endorses fetal movement  Patient denies any chest pain, headache, vision changes, changes in her urination, or palpitations.    Pregnancy course:   Fetal growth restriction, for which she follows with MFM. Her most recent ultrasound EFW 10th percentile.   h/o  marijuana use in early pregnancy    LMP 22  FRANCISCO: 22    OBHx:   GYNHx: Denies history of fibroids, ovarian cysts, STIs, abnormal pap smears   PMHx: anxiety  PSHx: Denies  SocHx: History of social alcohol use and recreational drug use prior to pregnancy, history of marijuana use during early pregnancy, feels safe at home   Meds: PNVs  Allergies: NKDA

## 2023-10-13 NOTE — OB RN PATIENT PROFILE - HISTORY OF COVID-19 VACCINATION
asymptomatic PVD, no indication for interventions Agree with assessment and plan.  Need outpatient followup for hematuria evaluation    Proscar    Luca Wood MD  [address]  [address]  [place], [place] [address] [phone] No

## 2023-10-13 NOTE — OB RN PATIENT PROFILE - FALL HARM RISK - RISK INTERVENTIONS

## 2023-10-13 NOTE — OB PROVIDER H&P - NSHPPHYSICALEXAM_GEN_ALL_CORE
BMI: 28.6  Sono: vertex  EFW: last was 10th%tile    T(C): 37 (10-13-23 @ 01:23), Max: 37 (10-13-23 @ 01:23)  HR: 72 (10-13-23 @ 01:30) (72 - 72)  BP: 141/73 (10-13-23 @ 01:30) (141/73 - 141/73)  RR: 18 (10-13-23 @ 01:23) (18 - 18)  SpO2: --    Gen: NAD, well-appearing, AAOx3   Abd: Soft, gravid  Ext: non-tender, non-edematous  SSE: deferred  SVE:  3/60/-3  Bedside sono: vertex  FHT: baseline 130, moderate variability, +accels, -decels   [place]: quiet

## 2023-10-13 NOTE — OB RN DELIVERY SUMMARY - NSSELHIDDEN_OBGYN_ALL_OB_FT
[NS_DeliveryAttending1_OBGYN_ALL_OB_FT:[email or web address]=],[NS_DeliveryRN_OBGYN_ALL_OB_FT:[id number]=] [NS_DeliveryAttending1_OBGYN_ALL_OB_FT:[email or web address]=],[NS_DeliveryRN_OBGYN_ALL_OB_FT:[id number]=],[NS_DeliveryAssist1_OBGYN_ALL_OB_FT:[id number]=]

## 2023-10-13 NOTE — OB PROVIDER H&P - ASSESSMENT
A/P: 22 yo  at 37w2d admitted for IOL in setting of gHTN and FGR (10th%tile)  -Admit to L&D  -Consent  -Admission labs  -NPO, except ice chips   -IV fluids  -IOL: Intact. 3cm, rare ctx. Will start induction with cervidil and transition to pitocin  -Fetus: Cat I tracing. Continuous toco and fetal monitoring.   -GBS: unknown. Term, no GBS prophylaxis required  -Analgesia: epi prn    Discussed with Dr. Mendoza   A/P: 24 yo  at 37w2d admitted for IOL in setting of gHTN and FGR (10th%tile)  -Admit to L&D  -Consent  -Admission labs  -NPO, except ice chips   -IV fluids  -IOL: Intact. 3cm, rare ctx. Will start induction with cervidil and transition to pitocin  -Fetus: Cat I tracing. Continuous toco and fetal monitoring.   -GBS: unknown. Term, no GBS prophylaxis required  -Analgesia: epi prn    Discussed with Dr. Mendoza    Addendum:    Subjective Hx and Physical Exam reviewed.  I agree with the Resident Physician's assessment and plan of care, as discussed above.  R/B/A of admission for labor management, vaginal delivery with possible  section discussed at length, including medications and options for pain management.  She has no Rastafari or personal objection to blood transfusion, if necessary.  She was given the opportunity to ask questions and all were addressed.  She understands the plan of care.    Darío Mendoza, DO

## 2023-10-13 NOTE — OB RN DELIVERY SUMMARY - NS_SEPSISRSKCALC_OBGYN_ALL_OB_FT
EOS calculated successfully. EOS Risk Factor: 0.5/1000 live births ([facility] national incidence); GA=37w3d; Temp=98.6; ROM=5.2; GBS='Unknown'; Antibiotics='No antibiotics or any antibiotics < 2 hrs prior to birth'

## 2023-10-13 NOTE — OB PROVIDER H&P - MLM HIDDEN
FMLA or Disability Forms were received and faxed to the Forms Completion Department today at [phone]. (Please include all appropriate authorization forms with your fax).    Did you have the patient complete (in full) and sign the “Authorization For Disclosure of Health Information Forms Completion” form? Yes    Have you communicated that the Forms Completion Process may take up to 14 days? Yes    If you have questions about this encounter, please contact the Forms Completion Dept at [phone], Option 3.    
yes

## 2023-10-13 NOTE — OB PROVIDER LABOR PROGRESS NOTE - ASSESSMENT
Pt admitted for induction of labor secondary to gHTN and FGR   -Elevated BPs but not within severe range   -Cat 1 tracing  -AROM, clear   -Continue pitocin 
VSS  FHT cat I   Progressing in latent labor   Cervidil removed, will switch to pitocin  Discussed AROM, pt declines at this time, will start on pit and reevaluated

## 2023-10-13 NOTE — OB RN PATIENT PROFILE - BREAST MILK PROVIDES PROTECTION AGAINST INFECTION
Nurses Note -- 4 Eyes      2/14/2023   11:34 PM      Skin assessed during: Q Shift Change      [x] No Pressure Injuries Present    [x]Prevention Measures Documented      [] Yes- Altered Skin Integrity Present or Discovered   [] LDA Added if Not in Epic (Describe Wound)   [] New Altered Skin Integrity was Present on Admit and Documented in LDA   [] Wound Image Taken    Wound Care Consulted? No    Attending Nurse:  Delphine Martines RN     Second RN/Staff Member:  PLACIDO Llanos     Statement Selected

## 2023-10-14 LAB
ALBUMIN SERPL ELPH-MCNC: 2.8 G/DL — LOW (ref 3.3–5.2)
ALP SERPL-CCNC: 164 U/L — HIGH (ref 40–120)
ALT FLD-CCNC: 12 U/L — SIGNIFICANT CHANGE UP
ANION GAP SERPL CALC-SCNC: 11 MMOL/L — SIGNIFICANT CHANGE UP (ref 5–17)
AST SERPL-CCNC: 32 U/L — HIGH
BILIRUB SERPL-MCNC: 0.9 MG/DL — SIGNIFICANT CHANGE UP (ref 0.4–2)
BUN SERPL-MCNC: 12 MG/DL — SIGNIFICANT CHANGE UP (ref 8–20)
CALCIUM SERPL-MCNC: 8.4 MG/DL — SIGNIFICANT CHANGE UP (ref 8.4–10.5)
CHLORIDE SERPL-SCNC: 104 MMOL/L — SIGNIFICANT CHANGE UP (ref 96–108)
CO2 SERPL-SCNC: 21 MMOL/L — LOW (ref 22–29)
CREAT SERPL-MCNC: 0.62 MG/DL — SIGNIFICANT CHANGE UP (ref 0.5–1.3)
EGFR: 127 ML/MIN/1.73M2 — SIGNIFICANT CHANGE UP
GLUCOSE SERPL-MCNC: 86 MG/DL — SIGNIFICANT CHANGE UP (ref 70–99)
HCT VFR BLD CALC: 27.1 % — LOW (ref 34.5–45)
HGB BLD-MCNC: 9.3 G/DL — LOW (ref 11.5–15.5)
MCHC RBC-ENTMCNC: 31 PG — SIGNIFICANT CHANGE UP (ref 27–34)
MCHC RBC-ENTMCNC: 34.3 GM/DL — SIGNIFICANT CHANGE UP (ref 32–36)
MCV RBC AUTO: 90.3 FL — SIGNIFICANT CHANGE UP (ref 80–100)
PLATELET # BLD AUTO: 202 K/UL — SIGNIFICANT CHANGE UP (ref 150–400)
POTASSIUM SERPL-MCNC: 4.2 MMOL/L — SIGNIFICANT CHANGE UP (ref 3.5–5.3)
POTASSIUM SERPL-SCNC: 4.2 MMOL/L — SIGNIFICANT CHANGE UP (ref 3.5–5.3)
PROT SERPL-MCNC: 5.3 G/DL — LOW (ref 6.6–8.7)
RBC # BLD: 3 M/UL — LOW (ref 3.8–5.2)
RBC # FLD: 14.1 % — SIGNIFICANT CHANGE UP (ref 10.3–14.5)
SODIUM SERPL-SCNC: 136 MMOL/L — SIGNIFICANT CHANGE UP (ref 135–145)
WBC # BLD: 10.44 K/UL — SIGNIFICANT CHANGE UP (ref 3.8–10.5)
WBC # FLD AUTO: 10.44 K/UL — SIGNIFICANT CHANGE UP (ref 3.8–10.5)

## 2023-10-14 PROCEDURE — 88307 TISSUE EXAM BY PATHOLOGIST: CPT | Mod: 26

## 2023-10-14 PROCEDURE — 59400 OBSTETRICAL CARE: CPT

## 2023-10-14 RX ORDER — OXYCODONE HYDROCHLORIDE 5 MG/1
5 TABLET ORAL ONCE
Refills: 0 | Status: DISCONTINUED | OUTPATIENT
Start: 2023-10-14 | End: 2023-10-16

## 2023-10-14 RX ORDER — SODIUM CHLORIDE 9 MG/ML
3 INJECTION INTRAMUSCULAR; INTRAVENOUS; SUBCUTANEOUS EVERY 8 HOURS
Refills: 0 | Status: DISCONTINUED | OUTPATIENT
Start: 2023-10-14 | End: 2023-10-16

## 2023-10-14 RX ORDER — SIMETHICONE 80 MG/1
80 TABLET, CHEWABLE ORAL EVERY 4 HOURS
Refills: 0 | Status: DISCONTINUED | OUTPATIENT
Start: 2023-10-14 | End: 2023-10-16

## 2023-10-14 RX ORDER — BENZOCAINE 10 %
1 GEL (GRAM) MUCOUS MEMBRANE EVERY 6 HOURS
Refills: 0 | Status: DISCONTINUED | OUTPATIENT
Start: 2023-10-14 | End: 2023-10-16

## 2023-10-14 RX ORDER — AER TRAVELER 0.5 G/1
1 SOLUTION RECTAL; TOPICAL EVERY 4 HOURS
Refills: 0 | Status: DISCONTINUED | OUTPATIENT
Start: 2023-10-14 | End: 2023-10-16

## 2023-10-14 RX ORDER — HYDROCORTISONE 1 %
1 OINTMENT (GRAM) TOPICAL EVERY 6 HOURS
Refills: 0 | Status: DISCONTINUED | OUTPATIENT
Start: 2023-10-14 | End: 2023-10-16

## 2023-10-14 RX ORDER — PRAMOXINE HYDROCHLORIDE 150 MG/15G
1 AEROSOL, FOAM RECTAL EVERY 4 HOURS
Refills: 0 | Status: DISCONTINUED | OUTPATIENT
Start: 2023-10-14 | End: 2023-10-16

## 2023-10-14 RX ORDER — OXYCODONE HYDROCHLORIDE 5 MG/1
5 TABLET ORAL
Refills: 0 | Status: DISCONTINUED | OUTPATIENT
Start: 2023-10-14 | End: 2023-10-16

## 2023-10-14 RX ORDER — TETANUS TOXOID, REDUCED DIPHTHERIA TOXOID AND ACELLULAR PERTUSSIS VACCINE, ADSORBED 5; 2.5; 8; 8; 2.5 [IU]/.5ML; [IU]/.5ML; UG/.5ML; UG/.5ML; UG/.5ML
0.5 SUSPENSION INTRAMUSCULAR ONCE
Refills: 0 | Status: DISCONTINUED | OUTPATIENT
Start: 2023-10-14 | End: 2023-10-16

## 2023-10-14 RX ORDER — ACETAMINOPHEN 500 MG
975 TABLET ORAL
Refills: 0 | Status: DISCONTINUED | OUTPATIENT
Start: 2023-10-14 | End: 2023-10-16

## 2023-10-14 RX ORDER — IBUPROFEN 200 MG
600 TABLET ORAL EVERY 6 HOURS
Refills: 0 | Status: DISCONTINUED | OUTPATIENT
Start: 2023-10-14 | End: 2023-10-16

## 2023-10-14 RX ORDER — IBUPROFEN 200 MG
600 TABLET ORAL EVERY 6 HOURS
Refills: 0 | Status: COMPLETED | OUTPATIENT
Start: 2023-10-14 | End: 2024-09-11

## 2023-10-14 RX ORDER — MAGNESIUM HYDROXIDE 400 MG/1
30 TABLET, CHEWABLE ORAL
Refills: 0 | Status: DISCONTINUED | OUTPATIENT
Start: 2023-10-14 | End: 2023-10-16

## 2023-10-14 RX ORDER — LANOLIN
1 OINTMENT (GRAM) TOPICAL EVERY 6 HOURS
Refills: 0 | Status: DISCONTINUED | OUTPATIENT
Start: 2023-10-14 | End: 2023-10-16

## 2023-10-14 RX ORDER — KETOROLAC TROMETHAMINE 30 MG/ML
30 SYRINGE (ML) INJECTION ONCE
Refills: 0 | Status: DISCONTINUED | OUTPATIENT
Start: 2023-10-14 | End: 2023-10-14

## 2023-10-14 RX ORDER — OXYTOCIN 10 UNIT/ML
41.67 VIAL (ML) INJECTION
Qty: 20 | Refills: 0 | Status: DISCONTINUED | OUTPATIENT
Start: 2023-10-14 | End: 2023-10-16

## 2023-10-14 RX ORDER — DIPHENHYDRAMINE HCL 50 MG
25 CAPSULE ORAL EVERY 6 HOURS
Refills: 0 | Status: DISCONTINUED | OUTPATIENT
Start: 2023-10-14 | End: 2023-10-16

## 2023-10-14 RX ORDER — DIBUCAINE 1 %
1 OINTMENT (GRAM) RECTAL EVERY 6 HOURS
Refills: 0 | Status: DISCONTINUED | OUTPATIENT
Start: 2023-10-14 | End: 2023-10-16

## 2023-10-14 RX ADMIN — Medication 975 MILLIGRAM(S): at 20:32

## 2023-10-14 RX ADMIN — Medication 1 TABLET(S): at 12:38

## 2023-10-14 RX ADMIN — Medication 30 MILLIGRAM(S): at 03:30

## 2023-10-14 RX ADMIN — Medication 600 MILLIGRAM(S): at 23:47

## 2023-10-14 RX ADMIN — Medication 1000 MILLIUNIT(S)/MIN: at 02:26

## 2023-10-14 RX ADMIN — Medication 600 MILLIGRAM(S): at 12:37

## 2023-10-14 RX ADMIN — Medication 30 MILLIGRAM(S): at 04:00

## 2023-10-14 RX ADMIN — Medication 975 MILLIGRAM(S): at 09:01

## 2023-10-14 NOTE — OB PROVIDER DELIVERY SUMMARY - NSPROVIDERDELIVERYNOTE_OBGYN_ALL_OB_FT
of a live male (Randell), 5lbs 2oz and Apgars 9/9. Delivered MARIA LUZ, no nuchal cord, clear fluid. Infant's head delivered with maternal expulsive efforts. Shoulders delivered without difficulty followed by the rest of the body. Nose and mouth were bulb suctioned. Cord clamped and cut after delay. Samples obtained. Baby handed to patient. Placenta delivered spontaneously, intact, 3VC. Fundus firm, minimal bleeding. Perineum and vagina inspected – hemostatic periurethral lac, small laceration in the vagina requiring 1 figure-of-eight stitch with chromic. EBL 203cc. Hemostasis noted. Pt tolerated procedure well, in stable condition, recovering in LDR. Infant in LDR. Instrument/sponge count correct x 2 and confirmed by nurse.

## 2023-10-14 NOTE — OB PROVIDER DELIVERY SUMMARY - NSSELHIDDEN_OBGYN_ALL_OB_FT
[NS_DeliveryAttending1_OBGYN_ALL_OB_FT:[email or web address]=],[NS_DeliveryRN_OBGYN_ALL_OB_FT:[id number]=],[NS_DeliveryAssist1_OBGYN_ALL_OB_FT:[id number]=]

## 2023-10-15 ENCOUNTER — TRANSCRIPTION ENCOUNTER (OUTPATIENT)
Age: 23
End: 2023-10-15

## 2023-10-15 RX ADMIN — Medication 600 MILLIGRAM(S): at 15:40

## 2023-10-15 RX ADMIN — Medication 1 TABLET(S): at 17:41

## 2023-10-15 RX ADMIN — Medication 975 MILLIGRAM(S): at 21:44

## 2023-10-15 RX ADMIN — Medication 975 MILLIGRAM(S): at 02:53

## 2023-10-15 RX ADMIN — Medication 600 MILLIGRAM(S): at 05:42

## 2023-10-15 NOTE — PROGRESS NOTE ADULT - SUBJECTIVE AND OBJECTIVE BOX
ASTON AVILA is a 23y GP now PPD#1 s/p spontaneous vaginal delivery at 37w2d gestation, uncomplicated.    S:    No acute events overnight.   The patient has no complaints.  Pain controlled with current treatment regimen.   She is ambulating without difficulty and tolerating PO.   + flatus/-BM/+ voiding   She endorses appropriate lochia, which is decreasing.   She is breastfeeding without difficulty.   She denies fevers, chills, nausea and vomiting.   She denies lightheadedness, dizziness, palpitations, chest pain and SOB.     O:    T(C): 36.6 (10-15-23 @ 03:58), Max: 36.7 (10-14-23 @ 12:40)  HR: 84 (10-15-23 @ 03:58) (78 - 85)  BP: 128/77 (10-15-23 @ 03:58) (128/77 - 144/91)  RR: 16 (10-15-23 @ 03:58) (16 - 18)  SpO2: 98% (10-15-23 @ 03:58) (98% - 98%)    Gen: NAD, AOx3, resting comfortably on room air   Abdomen:  Soft, non-tender, non-distended  Uterus:  Fundus firm below umbilicus  VE:  Expected lochia  Ext:  b/l LE non-tender                           9.3    10.44 )-----------( 202      ( 14 Oct 2023 12:12 )             27.1     10-14    136  |  104  |  12.0  ----------------------------<  86  4.2   |  21.0<L>  |  0.62    Ca    8.4      14 Oct 2023 12:12    TPro  5.3<L>  /  Alb  2.8<L>  /  TBili  0.9  /  DBili  x   /  AST  32<H>  /  ALT  12  /  AlkPhos  164<H>  10-14

## 2023-10-16 ENCOUNTER — APPOINTMENT (OUTPATIENT)
Dept: ANTEPARTUM | Facility: CLINIC | Age: 23
End: 2023-10-16

## 2023-10-16 VITALS
OXYGEN SATURATION: 98 % | RESPIRATION RATE: 18 BRPM | TEMPERATURE: 98 F | SYSTOLIC BLOOD PRESSURE: 149 MMHG | HEART RATE: 74 BPM | DIASTOLIC BLOOD PRESSURE: 85 MMHG

## 2023-10-16 LAB
ALBUMIN SERPL ELPH-MCNC: 3.1 G/DL — LOW (ref 3.3–5.2)
ALP SERPL-CCNC: 143 U/L — HIGH (ref 40–120)
ALT FLD-CCNC: 18 U/L — SIGNIFICANT CHANGE UP
ANION GAP SERPL CALC-SCNC: 11 MMOL/L — SIGNIFICANT CHANGE UP (ref 5–17)
AST SERPL-CCNC: 27 U/L — SIGNIFICANT CHANGE UP
BASOPHILS # BLD AUTO: 0.04 K/UL — SIGNIFICANT CHANGE UP (ref 0–0.2)
BASOPHILS NFR BLD AUTO: 0.5 % — SIGNIFICANT CHANGE UP (ref 0–2)
BILIRUB SERPL-MCNC: 0.2 MG/DL — LOW (ref 0.4–2)
BUN SERPL-MCNC: 8.4 MG/DL — SIGNIFICANT CHANGE UP (ref 8–20)
CALCIUM SERPL-MCNC: 8.6 MG/DL — SIGNIFICANT CHANGE UP (ref 8.4–10.5)
CHLORIDE SERPL-SCNC: 105 MMOL/L — SIGNIFICANT CHANGE UP (ref 96–108)
CO2 SERPL-SCNC: 25 MMOL/L — SIGNIFICANT CHANGE UP (ref 22–29)
CREAT SERPL-MCNC: 0.67 MG/DL — SIGNIFICANT CHANGE UP (ref 0.5–1.3)
EGFR: 125 ML/MIN/1.73M2 — SIGNIFICANT CHANGE UP
EOSINOPHIL # BLD AUTO: 0.25 K/UL — SIGNIFICANT CHANGE UP (ref 0–0.5)
EOSINOPHIL NFR BLD AUTO: 3 % — SIGNIFICANT CHANGE UP (ref 0–6)
GLUCOSE SERPL-MCNC: 102 MG/DL — HIGH (ref 70–99)
HCT VFR BLD CALC: 27.4 % — LOW (ref 34.5–45)
HGB BLD-MCNC: 9.3 G/DL — LOW (ref 11.5–15.5)
IMM GRANULOCYTES NFR BLD AUTO: 0.6 % — SIGNIFICANT CHANGE UP (ref 0–0.9)
LYMPHOCYTES # BLD AUTO: 2.09 K/UL — SIGNIFICANT CHANGE UP (ref 1–3.3)
LYMPHOCYTES # BLD AUTO: 25.2 % — SIGNIFICANT CHANGE UP (ref 13–44)
MCHC RBC-ENTMCNC: 31.3 PG — SIGNIFICANT CHANGE UP (ref 27–34)
MCHC RBC-ENTMCNC: 33.9 GM/DL — SIGNIFICANT CHANGE UP (ref 32–36)
MCV RBC AUTO: 92.3 FL — SIGNIFICANT CHANGE UP (ref 80–100)
MONOCYTES # BLD AUTO: 0.46 K/UL — SIGNIFICANT CHANGE UP (ref 0–0.9)
MONOCYTES NFR BLD AUTO: 5.5 % — SIGNIFICANT CHANGE UP (ref 2–14)
NEUTROPHILS # BLD AUTO: 5.41 K/UL — SIGNIFICANT CHANGE UP (ref 1.8–7.4)
NEUTROPHILS NFR BLD AUTO: 65.2 % — SIGNIFICANT CHANGE UP (ref 43–77)
PLATELET # BLD AUTO: 254 K/UL — SIGNIFICANT CHANGE UP (ref 150–400)
POTASSIUM SERPL-MCNC: 4 MMOL/L — SIGNIFICANT CHANGE UP (ref 3.5–5.3)
POTASSIUM SERPL-SCNC: 4 MMOL/L — SIGNIFICANT CHANGE UP (ref 3.5–5.3)
PROT SERPL-MCNC: 5.8 G/DL — LOW (ref 6.6–8.7)
RBC # BLD: 2.97 M/UL — LOW (ref 3.8–5.2)
RBC # FLD: 14.4 % — SIGNIFICANT CHANGE UP (ref 10.3–14.5)
SODIUM SERPL-SCNC: 141 MMOL/L — SIGNIFICANT CHANGE UP (ref 135–145)
WBC # BLD: 8.3 K/UL — SIGNIFICANT CHANGE UP (ref 3.8–10.5)
WBC # FLD AUTO: 8.3 K/UL — SIGNIFICANT CHANGE UP (ref 3.8–10.5)

## 2023-10-16 PROCEDURE — 86703 HIV-1/HIV-2 1 RESULT ANTBDY: CPT

## 2023-10-16 PROCEDURE — 85384 FIBRINOGEN ACTIVITY: CPT

## 2023-10-16 PROCEDURE — 88307 TISSUE EXAM BY PATHOLOGIST: CPT

## 2023-10-16 PROCEDURE — 81001 URINALYSIS AUTO W/SCOPE: CPT

## 2023-10-16 PROCEDURE — 85027 COMPLETE CBC AUTOMATED: CPT

## 2023-10-16 PROCEDURE — 84550 ASSAY OF BLOOD/URIC ACID: CPT

## 2023-10-16 PROCEDURE — 85730 THROMBOPLASTIN TIME PARTIAL: CPT

## 2023-10-16 PROCEDURE — 85610 PROTHROMBIN TIME: CPT

## 2023-10-16 PROCEDURE — 86780 TREPONEMA PALLIDUM: CPT

## 2023-10-16 PROCEDURE — 80053 COMPREHEN METABOLIC PANEL: CPT

## 2023-10-16 PROCEDURE — 87389 HIV-1 AG W/HIV-1&-2 AB AG IA: CPT

## 2023-10-16 PROCEDURE — 82570 ASSAY OF URINE CREATININE: CPT

## 2023-10-16 PROCEDURE — 86901 BLOOD TYPING SEROLOGIC RH(D): CPT

## 2023-10-16 PROCEDURE — 84156 ASSAY OF PROTEIN URINE: CPT

## 2023-10-16 PROCEDURE — 86850 RBC ANTIBODY SCREEN: CPT

## 2023-10-16 PROCEDURE — 83615 LACTATE (LD) (LDH) ENZYME: CPT

## 2023-10-16 PROCEDURE — 86900 BLOOD TYPING SEROLOGIC ABO: CPT

## 2023-10-16 PROCEDURE — 36415 COLL VENOUS BLD VENIPUNCTURE: CPT

## 2023-10-16 PROCEDURE — 85025 COMPLETE CBC W/AUTO DIFF WBC: CPT

## 2023-10-16 RX ORDER — ACETAMINOPHEN 500 MG
3 TABLET ORAL
Qty: 30 | Refills: 0
Start: 2023-10-16

## 2023-10-16 RX ORDER — IBUPROFEN 200 MG
1 TABLET ORAL
Qty: 30 | Refills: 0
Start: 2023-10-16

## 2023-10-16 RX ADMIN — Medication 975 MILLIGRAM(S): at 08:38

## 2023-10-16 NOTE — DISCHARGE NOTE OB - CARE PROVIDER_API CALL
Saroj Pedro  Obstetrics and Gynecology  [address], [address]  [place] [address]  Phone: [phone]  Fax: [phone]  Follow Up Time: 2 weeks

## 2023-10-16 NOTE — DISCHARGE NOTE OB - MATERIALS PROVIDED
[facility] Screening Program/Breastfeeding Log/Guide to Postpartum Care/Back To Sleep Handout/Breastfeeding Guide and Packet/Birth Certificate Instructions/Discharge Medication Information for Patients and Families Pocket Guide/MMR Vaccination (VIS Pub Date: 2012)/Tdap Vaccination (VIS Pub Date: 2012)

## 2023-10-16 NOTE — OB POSTPARTUM EVENT NOTE - NS_EVENTSUMMARY1_OBGYN_ALL_OB_FT
Called to see patient after FOB stated that he felt the patient was not safe to be alone with the baby due to her history of anxiety. Patient and FOB had disagreed over their plans for discharge, with the FOB hoping to have his family present but the patient asking for a quieter environment. Patient admits to feeling emotional but denies feeling overwhelmed, depressed or suicidal/homicidal. She has no plans to resume benzodiazepines and defers Zoloft at thus time after discussion, but will initiate counseling. Patient accepts the company of either the FOB, her mother or her father, and will call for a follow up appointment in the office for a blood pressure and mood check before Friday.   RITIKA accepts this discharge plan.

## 2023-10-16 NOTE — DISCHARGE NOTE OB - CARE PLAN
1 Principal Discharge DX:	 (normal spontaneous vaginal delivery)  Assessment and plan of treatment:	1) Please take Ibuprofen and Tylenol as needed for pain control  2) Nothing in the vagina for 6 weeks (including no sex, no tampons, and no douching).  3) Please call your doctor for a follow up your postpartum appointment in 4-6 weeks.  4) Please continue taking vitamins postpartum.   5) Please call the office sooner if you have heavy vaginal bleeding, severe abdominal pain, or fever > 100.4F.  6) You may resume regular activity as tolerated

## 2023-10-16 NOTE — PROGRESS NOTE ADULT - SUBJECTIVE AND OBJECTIVE BOX
ASTON AVILA is a 23y  now PPD#2 s/p spontaneous vaginal delivery at 37weeks gestation for gHTN and IUGR (10th%tile)    S:    No acute events overnight.   The patient has no complaints.  Pain controlled with current treatment regimen.   She is ambulating without difficulty and tolerating PO.   + flatus/-BM/+ voiding   She endorses appropriate lochia, which is decreasing.   She denies fevers, chills, nausea and vomiting.   She denies lightheadedness, dizziness, palpitations, chest pain and SOB.     O:    T(C): 36.7 (10-16-23 @ 04:32), Max: 36.7 (10-16-23 @ 00:30)  HR: 70 (10-16-23 @ 04:32) (70 - 80)  BP: 113/75 (10-16-23 @ 04:32) (113/75 - 144/90)  RR: 18 (10-16-23 @ 04:32) (18 - 18)  SpO2: 98% (10-16-23 @ 04:32) (98% - 99%)    Gen: NAD, AOx3  Abdomen:  Soft, non-tender, non-distended, +bowel sounds  Uterus:  Fundus firm below umbilicus  VE:  Expected lochia  Ext:  Bilateral lower extremities non-tender and non-edematous                          9.3    10.44 )-----------( 202      ( 14 Oct 2023 12:12 )             27.1     10-14    136  |  104  |  12.0  ----------------------------<  86  4.2   |  21.0<L>  |  0.62    Ca    8.4      14 Oct 2023 12:12    TPro  5.3<L>  /  Alb  2.8<L>  /  TBili  0.9  /  DBili  x   /  AST  32<H>  /  ALT  12  /  AlkPhos  164<H>  10-14

## 2023-10-16 NOTE — PROGRESS NOTE ADULT - ASSESSMENT
A/P:  23y  now PPD#2 s/p spontaneous vaginal delivery at 37weeks gestation for gHTN and IUGR (10th%tile)  -Vital signs stable  -Hgb:  10.7-> 9.3  -gHTN: two mildly elevated BPs on postpartum. 143/83 last night  -Voiding, tolerating PO, bowel function nml   -Advance care as tolerated   -Continue routine postpartum care and education  -Healthy male infant, circumcision has been performed  - DVT ppx: Ambulation encouraged. SCDs while in bed.   -Dispo: Patient to be discharged when meeting all postpartum milestones and pending attending approval.    A/P:  23y  now PPD#2 s/p spontaneous vaginal delivery at 37weeks gestation for gHTN and IUGR (10th%tile)    -Vital signs stable  -Hgb:  10.7-> 9.3  -gHTN: two mildly elevated BPs on postpartum. 143/83 last night  -Voiding, tolerating PO, bowel function nml   -Advance care as tolerated   -Continue routine postpartum care and education  -Healthy male infant, circumcision has been performed  - DVT ppx: Ambulation encouraged. SCDs while in bed.   -Dispo: Discharge     Addendum:    Subjective Hx, Physical Exam, & Laboratory results reviewed and Pt seen and examined at bedside.  I agree with the Resident Physician's assessment and plan of care, as discussed above.  She was given the opportunity to ask questions and all were addressed.    Darío Mendoza, DO

## 2023-10-16 NOTE — DISCHARGE NOTE OB - NS MD DC FALL RISK RISK
For information on Fall & Injury Prevention, visit: [email or web address] OR  [email or web address] OR  https://www.cdc.gov/steadi/patient.html

## 2023-10-16 NOTE — DISCHARGE NOTE OB - PATIENT PORTAL LINK FT
You can access the FollowMyHealth Patient Portal offered by [facility] by registering at the following website: [email or web address]. By joining [facility]’s FollowMyHealth portal, you will also be able to view your health information using other applications (apps) compatible with our system.

## 2023-10-19 ENCOUNTER — APPOINTMENT (OUTPATIENT)
Dept: ANTEPARTUM | Facility: CLINIC | Age: 23
End: 2023-10-19

## 2023-10-19 ENCOUNTER — APPOINTMENT (OUTPATIENT)
Dept: CARDIOLOGY | Facility: CLINIC | Age: 23
End: 2023-10-19

## 2023-10-23 ENCOUNTER — APPOINTMENT (OUTPATIENT)
Dept: ANTEPARTUM | Facility: CLINIC | Age: 23
End: 2023-10-23

## 2023-10-26 ENCOUNTER — APPOINTMENT (OUTPATIENT)
Dept: ANTEPARTUM | Facility: CLINIC | Age: 23
End: 2023-10-26

## 2023-10-30 ENCOUNTER — APPOINTMENT (OUTPATIENT)
Dept: ANTEPARTUM | Facility: CLINIC | Age: 23
End: 2023-10-30

## 2023-11-02 ENCOUNTER — APPOINTMENT (OUTPATIENT)
Dept: ANTEPARTUM | Facility: CLINIC | Age: 23
End: 2023-11-02

## 2023-11-02 PROBLEM — R56.9 UNSPECIFIED CONVULSIONS: Chronic | Status: ACTIVE | Noted: 2023-10-13

## 2023-11-25 NOTE — OB RN DELIVERY SUMMARY - NS_FINALEDD_OBGYN_ALL_OB_DT
"Speech-Language Pathology    SLP Inpatient MBSS Evaluation    Patient Name: Valorie Montelongo  MRN: [id number]  Today's Date: 11/25/2023   Time Calculation  Start Time: 0812  Stop Time: 0835  Time Calculation (min): 23 min       Assessment/Impression:   Severe pharyngeal deficits characterized by impaired swallow safety and efficiency.  Poor epiglottal inversion, tongue base retraction, and hyolaryngeal elevation/excursion resulting in SILENT aspiration with all consistencies assessed and residue following the swallow with all consistencies assessed.    *Consider late effect radiation dysphagia as at least partial cause of deficits observed.       Will need to have further conversation w/ pt and family regarding overall goals of care.  Regarding dysphagia, decision will need to be made- NPO w/ an alternative means of nutrition and hydration vs continued PO intake w/ known aspiration/PNA risk.       Full detailed SLP/Radiologist Modified Barium Swallow study report can be found under Chart Review tab, Imaging tab and  titled \"FL Modified Barium Swallow Study\"       Recommendations:  Solid Diet Recommendations : NPO   Liquid Diet Recommendations: NPO    OK for small amounts of ice chips (one at a time, 10x/hour) for oral comfort and to prevent swallow disuse atrophy, but only after aggressive oral care to avoid colonization of bacteria within the oral cavity.     Frequent, aggressive oral care to improve infection control, as well as to reduce dental plaque and bacteria on oropharyngeal surfaces which may increase the risk nosocomial infections, including pneumonia.         Plan:  SLP Plan: Skilled SLP warranted  SLP Frequency: 2x week  Duration: 2 weeks      Discussed POC: Patient, Medical Team, RN   Discussed Risks/Benefits: Yes  Patient/Caregiver Agreeable: Yes        Goals:  Pt will complete 10 reps of laryngeal and pharyngeal strengthening exercises x3 during session with good effort and efficiency given min cues for " "improved swallow safety and efficiency.  Pt will indicate understanding of dysphagia and risk for aspiration/PNA via teach back method with > 90% accuracy.         Subjective :  Pt arrived via transport for MBSS.  She verbally consented to the exam and tolerated with no s/s of distress.  O2 via NC.       Pt admitted 2' new onset weakness, confusion, changes in gait and speech.  Found to have new metastatic brain lesions.       Relevant SLP Hx:  CSE completed 11/24:  concern for pharyngeal dysphagia.  Pt reports new onset dysphagia over the past few days.  MBSS warranted for further assessment.  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  Hx of thyroid CA s/p resection 2021.  Current metastatic lung CA.         Objective:   General Visit Information:  Type of Study: Initial MBS  Consistencies Trialed: Thin liquid via spoon and straw, Mildly thick liquid via spoon and straw, Pureed Solid via spoon     Oral/Motor Assessment:  Oral Hygiene: WFL  Dentition: Upper dentures   Oral Motor: WFL         Oral Phase:  Functional with consistencies assessed   No significant oral residue observed following the swallow            Pharyngeal Phase:  Severe Deficits      Swallow was initiated following a delay.  There was reduced tongue base retraction, pharyngeal constriction, and hyolaryngeal elevation/excursion.  SILENT aspiration with all consistencies assessed 2' to inadequate and mistimed laryngeal vestibule closure.  Aspiration during the swallow with thin liquids, as well as following the swallow with thin liquids, mildly thick liquids, and puree solids.  Unable to clear with cue for strong cough.  Inability to clear made it difficult to ascertain \"new vs old\" aspiration in subsequent trials.  Significant residue at the valleculae and at times the piriforms following the swallow, worst w/ puree solids.  Pt w/ no sensitivity to presence of residue.  Did not clear with liquid wash.  Chin tuck and head " turn did not improve airway protection or aid in the clearance of residue.       11/25/23 at 11:41 AM - Yecenia Christian, SLP    01-Nov-2023

## 2023-12-23 LAB
SURGICAL PATHOLOGY STUDY: SIGNIFICANT CHANGE UP
SURGICAL PATHOLOGY STUDY: SIGNIFICANT CHANGE UP

## 2024-12-25 PROBLEM — F10.90 ALCOHOL USE: Status: ACTIVE | Noted: 2019-01-24

## 2025-06-26 ENCOUNTER — EMERGENCY (EMERGENCY)
Facility: HOSPITAL | Age: 25
LOS: 1 days | End: 2025-06-26
Attending: EMERGENCY MEDICINE
Payer: SELF-PAY

## 2025-06-26 VITALS
TEMPERATURE: 98 F | WEIGHT: 137.13 LBS | SYSTOLIC BLOOD PRESSURE: 111 MMHG | RESPIRATION RATE: 20 BRPM | OXYGEN SATURATION: 99 % | HEART RATE: 67 BPM | DIASTOLIC BLOOD PRESSURE: 71 MMHG | HEIGHT: 64 IN

## 2025-06-26 VITALS
WEIGHT: 136.47 LBS | DIASTOLIC BLOOD PRESSURE: 83 MMHG | OXYGEN SATURATION: 100 % | HEART RATE: 72 BPM | RESPIRATION RATE: 18 BRPM | TEMPERATURE: 98 F | HEIGHT: 64 IN | SYSTOLIC BLOOD PRESSURE: 131 MMHG

## 2025-06-26 VITALS
SYSTOLIC BLOOD PRESSURE: 119 MMHG | DIASTOLIC BLOOD PRESSURE: 72 MMHG | TEMPERATURE: 98 F | HEART RATE: 60 BPM | RESPIRATION RATE: 18 BRPM | OXYGEN SATURATION: 100 %

## 2025-06-26 PROCEDURE — 99283 EMERGENCY DEPT VISIT LOW MDM: CPT | Mod: 25

## 2025-06-26 PROCEDURE — 99284 EMERGENCY DEPT VISIT MOD MDM: CPT

## 2025-06-26 PROCEDURE — 90715 TDAP VACCINE 7 YRS/> IM: CPT

## 2025-06-26 PROCEDURE — 99283 EMERGENCY DEPT VISIT LOW MDM: CPT

## 2025-06-26 PROCEDURE — 90471 IMMUNIZATION ADMIN: CPT

## 2025-06-26 RX ORDER — DIAZEPAM 5 MG/1
1 TABLET ORAL
Refills: 0
Start: 2025-06-26

## 2025-06-26 RX ORDER — IBUPROFEN 200 MG
1 TABLET ORAL
Qty: 9 | Refills: 0
Start: 2025-06-26 | End: 2025-06-28

## 2025-06-26 RX ORDER — METRONIDAZOLE 250 MG
500 TABLET ORAL ONCE
Refills: 0 | Status: COMPLETED | OUTPATIENT
Start: 2025-06-26 | End: 2025-06-26

## 2025-06-26 RX ORDER — DIAZEPAM 5 MG/1
1 TABLET ORAL
Qty: 8 | Refills: 0
Start: 2025-06-26 | End: 2025-06-29

## 2025-06-26 RX ORDER — IBUPROFEN 200 MG
600 TABLET ORAL ONCE
Refills: 0 | Status: COMPLETED | OUTPATIENT
Start: 2025-06-26 | End: 2025-06-26

## 2025-06-26 RX ORDER — METRONIDAZOLE 250 MG
1 TABLET ORAL
Qty: 21 | Refills: 0
Start: 2025-06-26 | End: 2025-07-02

## 2025-06-26 RX ORDER — METRONIDAZOLE 250 MG
1 TABLET ORAL
Refills: 0
Start: 2025-06-26

## 2025-06-26 RX ORDER — AMOXICILLIN AND CLAVULANATE POTASSIUM 500; 125 MG/1; MG/1
1 TABLET, FILM COATED ORAL ONCE
Refills: 0 | Status: COMPLETED | OUTPATIENT
Start: 2025-06-26 | End: 2025-06-26

## 2025-06-26 RX ORDER — METHOCARBAMOL 500 MG/1
2 TABLET, FILM COATED ORAL
Qty: 18 | Refills: 0
Start: 2025-06-26 | End: 2025-06-28

## 2025-06-26 RX ORDER — DIAZEPAM 5 MG/1
5 TABLET ORAL ONCE
Refills: 0 | Status: COMPLETED | OUTPATIENT
Start: 2025-06-26 | End: 2025-06-26

## 2025-06-26 RX ORDER — DIAZEPAM 5 MG/1
5 TABLET ORAL ONCE
Refills: 0 | Status: DISCONTINUED | OUTPATIENT
Start: 2025-06-26 | End: 2025-06-26

## 2025-06-26 RX ORDER — AMOXICILLIN AND CLAVULANATE POTASSIUM 500; 125 MG/1; MG/1
1 TABLET, FILM COATED ORAL
Qty: 14 | Refills: 0
Start: 2025-06-26 | End: 2025-07-02

## 2025-06-26 RX ADMIN — DIAZEPAM 5 MILLIGRAM(S): 5 TABLET ORAL at 20:20

## 2025-06-26 RX ADMIN — Medication 500 MILLIGRAM(S): at 20:32

## 2025-06-26 RX ADMIN — AMOXICILLIN AND CLAVULANATE POTASSIUM 1 TABLET(S): 500; 125 TABLET, FILM COATED ORAL at 19:34

## 2025-06-26 RX ADMIN — Medication 600 MILLIGRAM(S): at 19:35

## 2025-06-26 NOTE — ED ADULT NURSE NOTE - NSFALLUNIVINTERV_ED_ALL_ED
Discharge and follow up instructions given to patient's mother. Patient meets criteria for discharge. Patient does not appear to be in pain. Mother states she is comfortable taking patient home. No questions or concerns at this time  
Bed/Stretcher in lowest position, wheels locked, appropriate side rails in place/Call bell, personal items and telephone in reach/Instruct patient to call for assistance before getting out of bed/chair/stretcher/Non-slip footwear applied when patient is off stretcher/[place] to call system/Physically safe environment - no spills, clutter or unnecessary equipment/Purposeful proactive rounding/Room/bathroom lighting operational, light cord in reach
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
## 2025-06-26 NOTE — ED ADULT TRIAGE NOTE - CHIEF COMPLAINT QUOTE
pt states she was dc'd from ED <10 minutes ago, but MD Dong called her back saying he wants to do additional work up.

## 2025-06-26 NOTE — ED PROVIDER NOTE - PHYSICAL EXAMINATION
General: non-toxic appearing, in no acute distress, A+O  HENT: neck supple, FROM of TMJ, oropharynx mildly erythematous without exudates or tonsillar hypertrophy   Eyes: pink conjunctivae, EOMI, PERRLA  CV: RRR, nl s1/s2.  Resp: CTAB, normal rate and effort  GI: Abdomen soft, NT, ND. Active bowel sounds. No rebound, no guarding  Neuro: CN II-XII intact, sensorimotor intact without deficits   MSK: No spine or joint tenderness to palpation, Full ROM and strength ext x 4. Normal Gait  Skin: healing linear 5cm superficial lac to L wrist without surrounding erythema, drainage or warmth, no muscle rigidity around musc. intact and perfused. General: non-toxic appearing, in no acute distress, A+O  HENT: neck supple, FROM of TMJ, oropharynx mildly erythematous without exudates or tonsillar hypertrophy   Eyes: pink conjunctivae, EOMI, PERRLA  CV: RRR, nl s1/s2.  Resp: CTAB, normal rate and effort  GI: Abdomen soft, NT, ND. Active bowel sounds. No rebound, no guarding  Neuro: CN II-XII intact, sensorimotor intact without deficits   MSK: No spine or joint tenderness to palpation, Full ROM and strength ext x 4. Normal Gait  Skin: healing linear 5cm superficial lac to L wrist without surrounding erythema, drainage or warmth, no muscle rigidity around lac.
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
## 2025-06-26 NOTE — ED PROVIDER NOTE - PATIENT PORTAL LINK FT
You can access the FollowMyHealth Patient Portal offered by [facility] by registering at the following website: [email or web address]. By joining [email or web address]’s FollowMyHealth portal, you will also be able to view your health information using other applications (apps) compatible with our system.
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
## 2025-06-26 NOTE — ED PROVIDER NOTE - SKIN, MLM
Skin normal color for race, warm, dry and intact. No evidence of rash. Abdomen soft, non-distended.  Moderately tender in the LLQ without rebound or guarding.   No organomegaly.  No masses.  BS active.  No abdominal bruits.
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
## 2025-06-26 NOTE — ED PROVIDER NOTE - CLINICAL SUMMARY MEDICAL DECISION MAKING FREE TEXT BOX
Case d/w ID on call Dr Roman and agree to dc home at this time  The patient worried about tetanus  The patient had vaccine as childhood and there is no objective signs of tetanus such as muscular spasm, lock jaw, elevated BP, tachycardia, nor fever  All of the muscles are soft and normal  Will dc home with oral abx for small wound

## 2025-06-26 NOTE — ED PROVIDER NOTE - PATIENT PORTAL LINK FT
You can access the FollowMyHealth Patient Portal offered by [facility] by registering at the following website: [email or web address]. By joining [employer]’s FollowMyHealth portal, you will also be able to view your health information using other applications (apps) compatible with our system.

## 2025-06-26 NOTE — ED PROVIDER NOTE - TIMING
Patient presented today at the coagulation clinic and seen face to face. Patient status was reviewed, INR obtained orders received and patient verbalized understanding of coumadin instructions. Please refer to tracking for detailed patient status and dosage information.    See Coumadin tracking flowsheet for documentation.   unknown
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
## 2025-07-02 ENCOUNTER — EMERGENCY (EMERGENCY)
Facility: HOSPITAL | Age: 25
LOS: 1 days | End: 2025-07-02
Attending: STUDENT IN AN ORGANIZED HEALTH CARE EDUCATION/TRAINING PROGRAM
Payer: SELF-PAY

## 2025-07-02 VITALS
HEIGHT: 64 IN | SYSTOLIC BLOOD PRESSURE: 107 MMHG | RESPIRATION RATE: 18 BRPM | DIASTOLIC BLOOD PRESSURE: 63 MMHG | HEART RATE: 68 BPM | TEMPERATURE: 98 F | WEIGHT: 138.01 LBS | OXYGEN SATURATION: 98 %

## 2025-07-02 PROCEDURE — 99282 EMERGENCY DEPT VISIT SF MDM: CPT

## 2025-07-02 PROCEDURE — 99283 EMERGENCY DEPT VISIT LOW MDM: CPT

## 2025-07-02 NOTE — ED PROVIDER NOTE - CCCP TRG CHIEF CMPLNT
Mother called requesting refill of child's Metadate 20 mg and 10 mg. Last med check was on 12/4/2020 with follow up scheduled next week. Medication last filled on 5/5/21. Per mother child is doing well on medication and there are no concerns. Message routed to MD to review and send to [facility] pharmacy.     
PDMP reviewed; no aberrant behavior identified, prescription authorized.    
muscle pain

## 2025-07-02 NOTE — ED PROVIDER NOTE - PATIENT PORTAL LINK FT
You can access the FollowMyHealth Patient Portal offered by [facility] by registering at the following website: [email or web address]. By joining [email or web address]’s FollowMyHealth portal, you will also be able to view your health information using other applications (apps) compatible with our system.
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
## 2025-07-02 NOTE — ED ADULT TRIAGE NOTE - CHIEF COMPLAINT QUOTE
pt presents to ED stating she has tetanus since last week and didn't have the "toxin neutralized" and needs it to be neutralized. pt c/p muscle rigitity and back pain r/t jaw.
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
## 2025-07-02 NOTE — ED ADULT TRIAGE NOTE - HEIGHT IN FEET
Have not received anything from pts surgeon yet. Pt is scheduled for Right knee excision prepatellar bursa by Dr Ramirez. Pt with ov tomorrow with Dr Jerry. This will be discussed with pt at ov.   5

## 2025-07-02 NOTE — ED PROVIDER NOTE - PHYSICAL EXAMINATION
General: non-toxic appearing, in no acute distress, A+O, normal phonation  HENT: normocephalic. Mucous membranes moist, no gingival inflammation, no tonsillar hypertrophy or exudates, uvula midline. Neck supple without bruits or adenopathy. speech clear, no trismus, no drooling, tolerating secretions  Eyes: pink conjunctivae, EOMI, PERRLA  CV: RRR, nl s1/s2.  Resp: CTAB, normal rate and effort  GI: Abdomen soft, NT, ND. Active bowel sounds. No rebound, no guarding  Neuro: A&O x 3, CN II-XII intact, sensorimotor intact without deficits   MSK: No spine or joint tenderness to palpation, Full ROM and strength ext x 4. Normal Gait. no muscle rigidity   Skin: no rashes, intact and perfused.